# Patient Record
Sex: FEMALE | Race: WHITE | Employment: FULL TIME | URBAN - METROPOLITAN AREA
[De-identification: names, ages, dates, MRNs, and addresses within clinical notes are randomized per-mention and may not be internally consistent; named-entity substitution may affect disease eponyms.]

---

## 2017-10-20 ENCOUNTER — OFFICE VISIT (OUTPATIENT)
Dept: FAMILY MEDICINE CLINIC | Facility: CLINIC | Age: 37
End: 2017-10-20

## 2017-10-20 VITALS
TEMPERATURE: 98 F | RESPIRATION RATE: 16 BRPM | OXYGEN SATURATION: 98 % | DIASTOLIC BLOOD PRESSURE: 64 MMHG | SYSTOLIC BLOOD PRESSURE: 110 MMHG | BODY MASS INDEX: 25.68 KG/M2 | HEART RATE: 60 BPM | WEIGHT: 156 LBS | HEIGHT: 65.5 IN

## 2017-10-20 DIAGNOSIS — R05.3 CHRONIC COUGH: Primary | ICD-10-CM

## 2017-10-20 DIAGNOSIS — R09.82 POST-NASAL DRIP: ICD-10-CM

## 2017-10-20 PROCEDURE — 99202 OFFICE O/P NEW SF 15 MIN: CPT | Performed by: NURSE PRACTITIONER

## 2017-10-20 RX ORDER — FLUTICASONE PROPIONATE 50 MCG
SPRAY, SUSPENSION (ML) NASAL
Qty: 1 BOTTLE | Refills: 0 | Status: SHIPPED | OUTPATIENT
Start: 2017-10-20

## 2017-10-20 NOTE — PROGRESS NOTES
CHIEF COMPLAINT:   Patient presents with:  Cough  Post Nasal Drip        HPI:   aYhaira Manning is a 44year old female who presents for cough for 1 month. Pt reports she had cold sx 1 month ago with nasal congestion, sore throat, mild cough.   Reports CARDIOVASCULAR: Denies chest pain or palpitations  LUNGS: Per HPI. GI: Denies N/V/C/D or abdominal pain. EXAM:   /64   Pulse 60   Temp 98.2 °F (36.8 °C) (Oral)   Resp 16   Ht 65.5\"   Wt 156 lb   LMP 10/06/2017   SpO2 98%   Breastfeeding?  No Everyone has had a cough as part of the common cold, flu, or bronchitis. This kind of cough occurs along with an achy feeling, low-grade fever, nasal and sinus congestion, and a scratchy or sore throat. This usually gets better in 2 to 3 weeks.  A cough bronwyn Cough may be the only sign of mild asthma. You may have tests to find out if asthma is causing your cough. You may also take asthma medicine on a trial basis.   Acid reflux (heartburn, GERD)  The esophagus is the tube that carries food from the mouth to the The patient indicates understanding of these issues and agrees to the plan.

## 2017-10-21 ENCOUNTER — TELEPHONE (OUTPATIENT)
Dept: FAMILY MEDICINE CLINIC | Facility: CLINIC | Age: 37
End: 2017-10-21

## 2017-10-21 NOTE — PATIENT INSTRUCTIONS
Chronic Cough with Uncertain Cause (Adult)    Everyone has had a cough as part of the common cold, flu, or bronchitis. This kind of cough occurs along with an achy feeling, low-grade fever, nasal and sinus congestion, and a scratchy or sore throat.  This Let your healthcare provider know if you are taking any of these. Asthma  Cough may be the only sign of mild asthma. You may have tests to find out if asthma is causing your cough. You may also take asthma medicine on a trial basis.   Acid reflux (heartbur © 7851-9320 [employer] [address] [place] [address], [place], [address] [place]. All rights reserved. This information is not intended as a substitute for professional medical care. Always follow your healthcare professional's instructions.

## 2017-10-21 NOTE — TELEPHONE ENCOUNTER
Attempted to contact pt for condition update. Was seen in [facility] yesterday. VM left to call with condition update.

## 2017-12-19 ENCOUNTER — OFFICE VISIT (OUTPATIENT)
Dept: FAMILY MEDICINE CLINIC | Facility: CLINIC | Age: 37
End: 2017-12-19

## 2017-12-19 VITALS
TEMPERATURE: 98 F | WEIGHT: 156 LBS | HEIGHT: 64 IN | BODY MASS INDEX: 26.63 KG/M2 | SYSTOLIC BLOOD PRESSURE: 102 MMHG | DIASTOLIC BLOOD PRESSURE: 65 MMHG | HEART RATE: 62 BPM | RESPIRATION RATE: 18 BRPM

## 2017-12-19 DIAGNOSIS — J06.9 ACUTE URI: ICD-10-CM

## 2017-12-19 PROCEDURE — 99212 OFFICE O/P EST SF 10 MIN: CPT | Performed by: FAMILY MEDICINE

## 2017-12-19 PROCEDURE — 99213 OFFICE O/P EST LOW 20 MIN: CPT | Performed by: FAMILY MEDICINE

## 2017-12-19 RX ORDER — AMOXICILLIN 875 MG/1
875 TABLET, COATED ORAL 2 TIMES DAILY
Qty: 20 TABLET | Refills: 0 | Status: SHIPPED | OUTPATIENT
Start: 2017-12-19 | End: 2020-01-28 | Stop reason: ALTCHOICE

## 2017-12-19 NOTE — PROGRESS NOTES
HPI:    Patient ID: Onur Leung is a 44year old female. Pt presents with recurrent cough for a couple months with intermittent sinus/ cold symptoms. Pt has had cough, sore throat at times. No fevers but has been chilled recently.  Pt has tried o Take 1 tablet (875 mg total) by mouth 2 (two) times daily.            Imaging & Referrals:  None       #[id number]

## 2018-02-19 ENCOUNTER — OFFICE VISIT (OUTPATIENT)
Dept: OBGYN CLINIC | Facility: CLINIC | Age: 38
End: 2018-02-19

## 2018-02-19 VITALS
WEIGHT: 157 LBS | HEART RATE: 60 BPM | BODY MASS INDEX: 27 KG/M2 | SYSTOLIC BLOOD PRESSURE: 102 MMHG | DIASTOLIC BLOOD PRESSURE: 65 MMHG

## 2018-02-19 DIAGNOSIS — Z01.419 WELL WOMAN EXAM WITH ROUTINE GYNECOLOGICAL EXAM: Primary | ICD-10-CM

## 2018-02-19 DIAGNOSIS — N64.4 BREAST PAIN, LEFT: ICD-10-CM

## 2018-02-19 PROCEDURE — 99395 PREV VISIT EST AGE 18-39: CPT | Performed by: CLINICAL NURSE SPECIALIST

## 2018-02-19 NOTE — PROGRESS NOTES
Mitali Liriano is a 44year old female [id number] Patient's last menstrual period was 02/05/2018 (exact date). Patient presents with:  Gyn Exam: Annual  Last annual exam was 3/20/15. Last pap was 1/22/14 and normal/ HPV negative.  Denies hx of abnormal pa Father 62     appendix   • Infectious Disease Mother      hepatitis       MEDICATIONS:    Current Outpatient Prescriptions:   •  amoxicillin 875 MG Oral Tab, Take 1 tablet (875 mg total) by mouth 2 (two) times daily. , Disp: 20 tablet, Rfl: 0  •  Fluticason discharge  Cervix:  Normal without tenderness on motion  Uterus: normal in size, contour, position, mobility, without tenderness  Adnexa: normal without masses or tenderness  Perineum: normal  Anus: no hemorroids     Assessment & Plan:  Barrett Hoyos was seen

## 2018-03-01 ENCOUNTER — HOSPITAL ENCOUNTER (OUTPATIENT)
Dept: MAMMOGRAPHY | Facility: HOSPITAL | Age: 38
Discharge: HOME | End: 2018-03-01
Attending: CLINICAL NURSE SPECIALIST
Payer: COMMERCIAL

## 2018-03-01 DIAGNOSIS — N64.4 BREAST PAIN, LEFT: ICD-10-CM

## 2018-03-01 PROCEDURE — 77066 DX MAMMO INCL CAD BI: CPT | Performed by: CLINICAL NURSE SPECIALIST

## 2019-10-07 ENCOUNTER — MED REC SCAN ONLY (OUTPATIENT)
Dept: FAMILY MEDICINE CLINIC | Facility: CLINIC | Age: 39
End: 2019-10-07

## 2019-11-05 ENCOUNTER — OFFICE VISIT (OUTPATIENT)
Dept: OBGYN CLINIC | Facility: CLINIC | Age: 39
End: 2019-11-05
Payer: COMMERCIAL

## 2019-11-05 VITALS
SYSTOLIC BLOOD PRESSURE: 108 MMHG | HEART RATE: 54 BPM | BODY MASS INDEX: 26.23 KG/M2 | DIASTOLIC BLOOD PRESSURE: 69 MMHG | WEIGHT: 153.63 LBS | HEIGHT: 64 IN

## 2019-11-05 DIAGNOSIS — Z01.419 WELL WOMAN EXAM: Primary | ICD-10-CM

## 2019-11-05 DIAGNOSIS — Z12.4 SCREENING FOR MALIGNANT NEOPLASM OF CERVIX: ICD-10-CM

## 2019-11-05 DIAGNOSIS — Z12.31 SCREENING MAMMOGRAM, ENCOUNTER FOR: ICD-10-CM

## 2019-11-05 PROCEDURE — 99396 PREV VISIT EST AGE 40-64: CPT | Performed by: OBSTETRICS & GYNECOLOGY

## 2019-11-05 NOTE — H&P
HPI:  The patient is a 36 yo F here for WWE. NO GYN c/o. Monthly cycles with 7 days light flow. Condoms with IC.  and monogamous. Wants to see  for screening due to strong family h/o CA.       LPS: 1/22/14 neg  Mammo: 3/1/18 cat 2    R Stress: Not on file    Relationships      Social connections:        Talks on phone: Not on file        Gets together: Not on file        Attends Congregation service: Not on file        Active member of club or organization: Not on file        Attends meetin breath  Gastrointestinal:  denies heartburn, abdominal pain, diarrhea or constipation  Genitourinary:  denies dysuria, incontinence, abnormal vaginal discharge, vaginal itching  Musculoskeletal:  denies back pain.   Skin/Breast:  Denies any breast pain, lum patient   5. Discussed diet, exercise, MVIs  6.  Follow up in  1 yr for annual

## 2020-01-20 ENCOUNTER — HOSPITAL ENCOUNTER (OUTPATIENT)
Dept: MAMMOGRAPHY | Facility: HOSPITAL | Age: 40
Discharge: HOME | End: 2020-01-20
Attending: OBSTETRICS & GYNECOLOGY
Payer: COMMERCIAL

## 2020-01-20 DIAGNOSIS — Z12.31 SCREENING MAMMOGRAM, ENCOUNTER FOR: ICD-10-CM

## 2020-01-20 PROCEDURE — 77063 BREAST TOMOSYNTHESIS BI: CPT | Performed by: OBSTETRICS & GYNECOLOGY

## 2020-01-20 PROCEDURE — 77067 SCR MAMMO BI INCL CAD: CPT | Performed by: OBSTETRICS & GYNECOLOGY

## 2020-01-21 NOTE — H&P
[address] [facility] [address] Gastroenterology                                                                                                  Clinic History and Physical     Pa perineal laceration    • Labial adhesions     PP, repair   • Lipid screening 2006    per NG      Past Surgical History:   Procedure Laterality Date   •   , 2010   • OTHER SURGICAL HISTORY      repair-labial adhesion PP      Family Hx:   F last menstrual period 01/07/2020, not currently breastfeeding.     Gen: patient appears comfortable and in no acute distress  HEENT: conjunctiva pink, the sclera appears anicteric, oropharynx clear, mucus membranes appear moist  CV: regular rate and rhythm, Split dose Colyte/TriLyte or equivalent  -Anti-platelets and anti-coagulants: None  -Diabetes meds: None    ** If MAC @ [facility]:    - HOLD ACE/ARBs the night before and/or the day of the procedure(s) - N/A   - NO alcohol, recreational drugs nor erectile dys

## 2020-01-28 ENCOUNTER — TELEPHONE (OUTPATIENT)
Dept: GASTROENTEROLOGY | Facility: CLINIC | Age: 40
End: 2020-01-28

## 2020-01-28 ENCOUNTER — OFFICE VISIT (OUTPATIENT)
Dept: GASTROENTEROLOGY | Facility: CLINIC | Age: 40
End: 2020-01-28
Payer: COMMERCIAL

## 2020-01-28 VITALS
HEIGHT: 64 IN | WEIGHT: 152.38 LBS | SYSTOLIC BLOOD PRESSURE: 103 MMHG | DIASTOLIC BLOOD PRESSURE: 68 MMHG | BODY MASS INDEX: 26.02 KG/M2 | HEART RATE: 50 BPM

## 2020-01-28 DIAGNOSIS — Z12.11 SCREENING FOR COLON CANCER: Primary | ICD-10-CM

## 2020-01-28 DIAGNOSIS — R10.13 DYSPEPSIA: ICD-10-CM

## 2020-01-28 DIAGNOSIS — Z80.0 FAMILY HISTORY OF COLON CANCER: ICD-10-CM

## 2020-01-28 PROCEDURE — 99203 OFFICE O/P NEW LOW 30 MIN: CPT | Performed by: NURSE PRACTITIONER

## 2020-01-28 NOTE — PATIENT INSTRUCTIONS
-Schedule colonoscopy w/ Dr. Hermila Martinez, Dr. Elena Rebolledo, Dr. Quin Newman with IV Meherrin or MAC  Dx: screening, [facility] [place]  -Eligible for NE: Yes  -Prep: Split dose Colyte/TriLyte or equivalent  -Anti-platelets and anti-coagulants: None  -Diabetes meds: None    ** If MA

## 2020-01-28 NOTE — TELEPHONE ENCOUNTER
Scheduled for:  Colonoscopy 75531  Provider Name: Dalila Davis  Date:  2/25/2020  Location:  [facility]  Sedation:  Mac  Time:  1600 ---- Arrival 1500 ( Estimate time )   Prep: Colyte or Trilte  Meds/Allergies Reconciled?:  Physician reviewed  Diagnosis with codes:

## 2020-02-24 ENCOUNTER — TELEPHONE (OUTPATIENT)
Dept: GASTROENTEROLOGY | Facility: CLINIC | Age: 40
End: 2020-02-24

## 2020-02-24 NOTE — TELEPHONE ENCOUNTER
Scheduled for:  Colonoscopy 74367  Provider Name: Germaine Ovalle  Date:  04/21/2020  Location:  [facility]  Sedation:  Mac  Time:  1:00pm --- 12:00pm ( Estimate time )   Prep: Colyte or Trilyte  Meds/Allergies Reconciled?:  Physician reviewed  Diagnosis with codes:  Co

## 2020-02-24 NOTE — TELEPHONE ENCOUNTER
CASEYM (x2) to reschedule procedure. Procedure CANCELED in Epic. Please transfer to [facility] at ext [phone] for scheduling.

## 2020-02-24 NOTE — TELEPHONE ENCOUNTER
Dr Tutu Díaz - Patient opened her prep this morning, can you send new prep to pharmacy. Procedure rescheduled to 04/21/2020    Thank you.

## 2020-05-11 ENCOUNTER — TELEMEDICINE (OUTPATIENT)
Dept: FAMILY MEDICINE CLINIC | Facility: CLINIC | Age: 40
End: 2020-05-11
Payer: COMMERCIAL

## 2020-05-11 VITALS — TEMPERATURE: 97 F

## 2020-05-11 DIAGNOSIS — H01.004 BLEPHARITIS OF LEFT UPPER EYELID, UNSPECIFIED TYPE: ICD-10-CM

## 2020-05-11 PROCEDURE — 99213 OFFICE O/P EST LOW 20 MIN: CPT | Performed by: FAMILY MEDICINE

## 2020-05-11 RX ORDER — ERYTHROMYCIN 5 MG/G
OINTMENT OPHTHALMIC
Qty: 1 TUBE | Refills: 0 | Status: SHIPPED | OUTPATIENT
Start: 2020-05-11

## 2020-05-11 NOTE — PROGRESS NOTES
HPI:    Patient ID: Doron Borja is a 35year old female. This visit is conducted using Telemedicine with live, interactive video and audio during this Coronavirus pandemic.     Please note that the following visit was completed using two-wayjulia (SUPREP BOWEL PREP KIT) 17.5-3.13-1.6 GM/177ML Oral Solution As directed. (Patient not taking: Reported on 5/11/2020 ) 1 Bottle 0   • PEG 3350-KCl-NaBcb-NaCl-NaSulf (COLYTE WITH FLAVOR PACKS) 240 g Oral Recon Soln Take prep as directed by gastro office.  Jeannine Voss

## 2020-05-14 ENCOUNTER — TELEPHONE (OUTPATIENT)
Dept: GASTROENTEROLOGY | Facility: CLINIC | Age: 40
End: 2020-05-14

## 2020-05-18 ENCOUNTER — TELEPHONE (OUTPATIENT)
Dept: GASTROENTEROLOGY | Facility: CLINIC | Age: 40
End: 2020-05-18

## 2020-05-18 NOTE — TELEPHONE ENCOUNTER
Contacted pt to offer her earlier procedure date of 5/26 or 6/2 w/ [facility]

## 2020-07-10 ENCOUNTER — HOSPITAL ENCOUNTER (OUTPATIENT)
Age: 40
Discharge: HOME | End: 2020-07-10
Payer: COMMERCIAL

## 2020-07-10 ENCOUNTER — NURSE TRIAGE (OUTPATIENT)
Dept: FAMILY MEDICINE CLINIC | Facility: CLINIC | Age: 40
End: 2020-07-10

## 2020-07-10 VITALS
BODY MASS INDEX: 24.11 KG/M2 | HEART RATE: 65 BPM | DIASTOLIC BLOOD PRESSURE: 83 MMHG | WEIGHT: 150 LBS | TEMPERATURE: 98 F | OXYGEN SATURATION: 100 % | RESPIRATION RATE: 14 BRPM | HEIGHT: 66 IN | SYSTOLIC BLOOD PRESSURE: 129 MMHG

## 2020-07-10 DIAGNOSIS — R05.9 COUGH: Primary | ICD-10-CM

## 2020-07-10 DIAGNOSIS — Z20.822 ENCOUNTER FOR LABORATORY TESTING FOR COVID-19 VIRUS: ICD-10-CM

## 2020-07-10 LAB — AMB EXT COVID-19 RESULT: DETECTED

## 2020-07-10 PROCEDURE — U0003 INFECTIOUS AGENT DETECTION BY NUCLEIC ACID (DNA OR RNA); SEVERE ACUTE RESPIRATORY SYNDROME CORONAVIRUS 2 (SARS-COV-2) (CORONAVIRUS DISEASE [COVID-19]), AMPLIFIED PROBE TECHNIQUE, MAKING USE OF HIGH THROUGHPUT TECHNOLOGIES AS DESCRIBED BY CMS-2020-01-R: HCPCS | Performed by: NURSE PRACTITIONER

## 2020-07-10 PROCEDURE — 99213 OFFICE O/P EST LOW 20 MIN: CPT | Performed by: NURSE PRACTITIONER

## 2020-07-10 NOTE — ED INITIAL ASSESSMENT (HPI)
Pt presents to the IC with c/o a cough, headaches and loss of smell. Pt presents with a request for covid testing.

## 2020-07-10 NOTE — ED PROVIDER NOTES
Patient presents with:  Cough/URI  Headache      HPI:     Laura Bird is a 38year old female who presents for evaluation and management of a chief complaint of a dry cough, post nasal drip, and a loss of smell for the past couple days.  She denies control/protection: Condom    Lifestyle      Physical activity:        Days per week: Not on file        Minutes per session: Not on file      Stress: Not on file    Relationships      Social connections:        Talks on phone: Not on file        Gets toge bilateral: normal  NOSE: nasal turbinates: pink, normal mucosa  THROAT: clear, without exudates, uvula midline and airway patent  LUNGS: clear to auscultation bilaterally; no rales, rhonchi, or wheezes    MDM/Assessment/Plan:   Orders for this encounter:

## 2020-07-10 NOTE — TELEPHONE ENCOUNTER
Action Requested: Summary for Provider     []  Critical Lab, Recommendations Needed  [] Need Additional Advice  [x]   FYI    []   Need Orders  [] Need Medications Sent to Pharmacy  []  Other     SUMMARY: Patient calling with complaint of loss of smell, caroline

## 2020-07-12 LAB — SARS-COV-2 RNA RESP QL NAA+PROBE: DETECTED

## 2020-07-14 ENCOUNTER — VIRTUAL PHONE E/M (OUTPATIENT)
Dept: FAMILY MEDICINE CLINIC | Facility: CLINIC | Age: 40
End: 2020-07-14

## 2020-07-14 DIAGNOSIS — U07.1 COVID-19: ICD-10-CM

## 2020-07-14 PROCEDURE — 99213 OFFICE O/P EST LOW 20 MIN: CPT | Performed by: FAMILY MEDICINE

## 2020-07-14 NOTE — TELEPHONE ENCOUNTER
Diagnosis:      ICD-10-CM     1. Cough R05     2. Encounter for laboratory testing for COVID-19 virus Z11.59           All results reviewed and discussed with patient.   See AVS for detailed discharge instructions for your condition today.     Follow Up wit

## 2020-07-14 NOTE — PROGRESS NOTES
HPI:    Patient ID: Atul Guadalupe is a 38year old female. Virtual Telephone Check-In    Atul Guadalupe verbally consents to a Virtual/Telephone Check-In visit on 07/14/20.   Patient has been referred to the [facility] website at [email or web address] each nostril daily (Patient not taking: Reported on 5/11/2020 ) 1 Bottle 0     Allergies:No Known Allergies   PHYSICAL EXAM:   Physical Exam   Constitutional: No distress.    Pulmonary/Chest:   Pt is breathing comfortable over the phone and speaking in full

## 2020-07-18 ENCOUNTER — TELEPHONE (OUTPATIENT)
Dept: FAMILY MEDICINE CLINIC | Facility: CLINIC | Age: 40
End: 2020-07-18

## 2020-07-18 NOTE — TELEPHONE ENCOUNTER
Pt states she tested positive for Covid-19, pt was tested on 7/10/2020. Pt states she needs to be tested again and have a negative Covid-19 test because her sons are playing baseball and all family members need to test negative before they can participate.

## 2020-07-20 NOTE — TELEPHONE ENCOUNTER
Advised patient of Dr. Shelly Sierra note. Information on testing centers provided to patient. Patient verbalized understanding.

## 2020-07-20 NOTE — TELEPHONE ENCOUNTER
Message noted. She will need to be in self quarantine for 2 weeks. As far as requesting.  Our facility does not due retesting so she may need to do this at one of the public health sites or [facility]. She will need to rest at least 2 weeks from her last test.

## 2020-08-17 ENCOUNTER — NURSE TRIAGE (OUTPATIENT)
Dept: FAMILY MEDICINE CLINIC | Facility: CLINIC | Age: 40
End: 2020-08-17

## 2020-08-17 NOTE — TELEPHONE ENCOUNTER
Action Requested: Summary for Provider     []  Critical Lab, Recommendations Needed  [] Need Additional Advice  [x]   FYI    []   Need Orders  [] Need Medications Sent to Pharmacy  []  Other     SUMMARY: Patient states she tested positive for COVID-19 on

## 2020-08-18 ENCOUNTER — TELEMEDICINE (OUTPATIENT)
Dept: FAMILY MEDICINE CLINIC | Facility: CLINIC | Age: 40
End: 2020-08-18
Payer: COMMERCIAL

## 2020-08-18 DIAGNOSIS — R05.9 COUGH: ICD-10-CM

## 2020-08-18 DIAGNOSIS — Z86.16 HISTORY OF 2019 NOVEL CORONAVIRUS DISEASE (COVID-19): ICD-10-CM

## 2020-08-18 PROCEDURE — 99213 OFFICE O/P EST LOW 20 MIN: CPT | Performed by: FAMILY MEDICINE

## 2020-08-18 NOTE — PROGRESS NOTES
HPI:    Patient ID: Kaye Pedraza is a 38year old female. This visit is conducted using Telemedicine with live, interactive video and audio during this Coronavirus pandemic.     Please note that the following visit was completed using two-way, julia and stridor. Cardiovascular: Negative for chest pain. Current Outpatient Medications   Medication Sig Dispense Refill   • erythromycin 5 MG/GM Ophthalmic Ointment Apply a small dab to the affected area of the eye as directed once per day.  1 T

## 2020-10-01 ENCOUNTER — TELEPHONE (OUTPATIENT)
Dept: OBGYN CLINIC | Facility: CLINIC | Age: 40
End: 2020-10-01

## 2020-10-01 NOTE — TELEPHONE ENCOUNTER
Pt informed according to our records, she had a aboRH done in 2009 and her blood type is A positive. Pt asking if people with A positive blood type are more susceptible to COVID. Pt informed I am unsure and she may want to ask her PCP that question.  Pt giovanna

## 2020-11-05 ENCOUNTER — TELEPHONE (OUTPATIENT)
Dept: GASTROENTEROLOGY | Facility: CLINIC | Age: 40
End: 2020-11-05

## 2020-11-05 NOTE — TELEPHONE ENCOUNTER
CBLM to cancel & reschedule procedure. Please transfer to Nikos Crowder at ext [phone] for scheduling. Canceled in [address] Electronic case CANCEL request was sent to [facility] via \[facility] [place] [facility]\"". Notified pt of cancellation on VM box.          Canceled for:  Colonoscopy -

## 2020-11-25 NOTE — TELEPHONE ENCOUNTER
Left message to call back and schedule. If patient calls back please forward call to GI Schedulers. 2nd attempt to schedule patient with no success. Per protocol ok to send out letter. TE Closed.

## 2021-01-31 ENCOUNTER — HOSPITAL ENCOUNTER (OUTPATIENT)
Age: 41
Discharge: HOME | End: 2021-01-31
Payer: COMMERCIAL

## 2021-01-31 VITALS
RESPIRATION RATE: 16 BRPM | SYSTOLIC BLOOD PRESSURE: 115 MMHG | OXYGEN SATURATION: 100 % | HEART RATE: 60 BPM | DIASTOLIC BLOOD PRESSURE: 56 MMHG | TEMPERATURE: 97 F

## 2021-01-31 DIAGNOSIS — Z20.822 CLOSE EXPOSURE TO COVID-19 VIRUS: Primary | ICD-10-CM

## 2021-01-31 DIAGNOSIS — Z20.822 LAB TEST NEGATIVE FOR COVID-19 VIRUS: ICD-10-CM

## 2021-01-31 LAB — SARS-COV-2 RNA RESP QL NAA+PROBE: NOT DETECTED

## 2021-01-31 PROCEDURE — 99213 OFFICE O/P EST LOW 20 MIN: CPT | Performed by: NURSE PRACTITIONER

## 2021-01-31 NOTE — ED PROVIDER NOTES
Patient Seen in: [facility]      History   Patient presents with:  Covid-19 Test    Stated Complaint: Testing- no symptoms    HPI/Subjective:   HPI    Patient presents to the immediate care requesting a COVID-19 test.  Patient states that he Negative. Skin: Negative. Neurological: Negative. Psychiatric/Behavioral: Negative. Positive for stated complaint: Testing- no symptoms  Other systems are as noted in HPI. Constitutional and vital signs reviewed.       All other systems rev advised that since she did have a close contact Covid exposure that if she does develop symptoms she does need to quarantine for 2 weeks.   Advised that if the patient develops  COVID-19 symptoms including cough, fevers, sore throat, shortness of breath, susana

## 2021-02-08 ENCOUNTER — HOSPITAL ENCOUNTER (OUTPATIENT)
Age: 41
Discharge: HOME | End: 2021-02-08
Payer: COMMERCIAL

## 2021-02-08 VITALS
BODY MASS INDEX: 24.11 KG/M2 | DIASTOLIC BLOOD PRESSURE: 67 MMHG | WEIGHT: 150 LBS | HEART RATE: 60 BPM | RESPIRATION RATE: 16 BRPM | TEMPERATURE: 97 F | OXYGEN SATURATION: 100 % | SYSTOLIC BLOOD PRESSURE: 104 MMHG | HEIGHT: 66 IN

## 2021-02-08 DIAGNOSIS — J02.9 VIRAL PHARYNGITIS: Primary | ICD-10-CM

## 2021-02-08 DIAGNOSIS — Z20.822 LAB TEST NEGATIVE FOR COVID-19 VIRUS: ICD-10-CM

## 2021-02-08 LAB
S PYO AG THROAT QL: NEGATIVE
SARS-COV-2 RNA RESP QL NAA+PROBE: NOT DETECTED

## 2021-02-08 PROCEDURE — 87880 STREP A ASSAY W/OPTIC: CPT | Performed by: NURSE PRACTITIONER

## 2021-02-08 PROCEDURE — U0002 COVID-19 LAB TEST NON-CDC: HCPCS | Performed by: NURSE PRACTITIONER

## 2021-02-08 PROCEDURE — 99213 OFFICE O/P EST LOW 20 MIN: CPT | Performed by: NURSE PRACTITIONER

## 2021-02-08 NOTE — ED PROVIDER NOTES
Patient presents with:  Testing      HPI:     Onur Leung is a 38year old female who presents for a mild sore throat for the past 4 to 5 days. No difficulty swallowing. Speech is clear. No difficulty breathing or stridor.   She states she did ha Partners: Male        Birth control/protection: Condom    Lifestyle      Physical activity        Days per week: Not on file        Minutes per session: Not on file      Stress: Not on file    Relationships      Social connections        Talks on phon normal  NOSE: nasal turbinates: pink, normal mucosa  THROAT: redness noted, uvula midline and airway patent  LUNGS: CTA. Nonlabored respirations. No rhonchi, rales, or wheezing. No coughing. NEURO: No deficits.      MDM/Assessment/Plan:   Orders for [facility]

## 2021-02-08 NOTE — ED INITIAL ASSESSMENT (HPI)
Pt c/o sore throat x5 days. No cold/cough. No fever. States  tested positive for covid 1/26/21. States had covid 7/2020. Awake/alert. Breathing easy and even without distress. Speech clear. Skin warm, dry and pink.

## 2021-04-15 ENCOUNTER — IMMUNIZATION (OUTPATIENT)
Dept: LAB | Facility: HOSPITAL | Age: 41
End: 2021-04-15
Attending: EMERGENCY MEDICINE
Payer: COMMERCIAL

## 2021-04-15 DIAGNOSIS — Z23 NEED FOR VACCINATION: Primary | ICD-10-CM

## 2021-04-15 PROCEDURE — 0011A SARSCOV2 VAC 100MCG/0.5ML IM: CPT

## 2021-05-13 ENCOUNTER — IMMUNIZATION (OUTPATIENT)
Dept: LAB | Facility: HOSPITAL | Age: 41
End: 2021-05-13
Attending: EMERGENCY MEDICINE
Payer: COMMERCIAL

## 2021-05-13 DIAGNOSIS — Z23 NEED FOR VACCINATION: Primary | ICD-10-CM

## 2021-05-13 PROCEDURE — 0012A SARSCOV2 VAC 100MCG/0.5ML IM: CPT

## 2022-01-07 ENCOUNTER — IMMUNIZATION (OUTPATIENT)
Dept: LAB | Facility: HOSPITAL | Age: 42
End: 2022-01-07
Attending: EMERGENCY MEDICINE
Payer: COMMERCIAL

## 2022-01-07 DIAGNOSIS — Z23 NEED FOR VACCINATION: Primary | ICD-10-CM

## 2022-01-07 PROCEDURE — 0064A SARSCOV2 VAC 50MCG/0.25ML IM: CPT | Performed by: NURSE PRACTITIONER

## 2022-02-11 ENCOUNTER — OFFICE VISIT (OUTPATIENT)
Dept: FAMILY MEDICINE CLINIC | Facility: CLINIC | Age: 42
End: 2022-02-11
Payer: COMMERCIAL

## 2022-02-11 VITALS
DIASTOLIC BLOOD PRESSURE: 76 MMHG | TEMPERATURE: 99 F | WEIGHT: 156 LBS | BODY MASS INDEX: 25.07 KG/M2 | SYSTOLIC BLOOD PRESSURE: 104 MMHG | OXYGEN SATURATION: 99 % | HEIGHT: 66 IN | RESPIRATION RATE: 12 BRPM | HEART RATE: 53 BPM

## 2022-02-11 DIAGNOSIS — H00.011 HORDEOLUM EXTERNUM OF RIGHT UPPER EYELID: Primary | ICD-10-CM

## 2022-02-11 PROCEDURE — 3008F BODY MASS INDEX DOCD: CPT | Performed by: NURSE PRACTITIONER

## 2022-02-11 PROCEDURE — 3074F SYST BP LT 130 MM HG: CPT | Performed by: NURSE PRACTITIONER

## 2022-02-11 PROCEDURE — 3078F DIAST BP <80 MM HG: CPT | Performed by: NURSE PRACTITIONER

## 2022-02-11 PROCEDURE — 99213 OFFICE O/P EST LOW 20 MIN: CPT | Performed by: NURSE PRACTITIONER

## 2022-02-11 RX ORDER — ERYTHROMYCIN 5 MG/G
1 OINTMENT OPHTHALMIC 2 TIMES DAILY
Qty: 1 G | Refills: 0 | Status: SHIPPED | OUTPATIENT
Start: 2022-02-11 | End: 2022-02-18

## 2022-09-30 ENCOUNTER — OFFICE VISIT (OUTPATIENT)
Dept: OBGYN CLINIC | Facility: CLINIC | Age: 42
End: 2022-09-30

## 2022-09-30 VITALS
HEART RATE: 55 BPM | SYSTOLIC BLOOD PRESSURE: 99 MMHG | BODY MASS INDEX: 25 KG/M2 | WEIGHT: 153 LBS | DIASTOLIC BLOOD PRESSURE: 63 MMHG

## 2022-09-30 DIAGNOSIS — Z12.31 VISIT FOR SCREENING MAMMOGRAM: ICD-10-CM

## 2022-09-30 DIAGNOSIS — Z01.419 ENCOUNTER FOR GYNECOLOGICAL EXAMINATION WITHOUT ABNORMAL FINDING: Primary | ICD-10-CM

## 2022-09-30 PROCEDURE — 3078F DIAST BP <80 MM HG: CPT | Performed by: OBSTETRICS & GYNECOLOGY

## 2022-09-30 PROCEDURE — 99396 PREV VISIT EST AGE 40-64: CPT | Performed by: OBSTETRICS & GYNECOLOGY

## 2022-09-30 PROCEDURE — 3074F SYST BP LT 130 MM HG: CPT | Performed by: OBSTETRICS & GYNECOLOGY

## 2022-10-03 LAB — HPV I/H RISK 1 DNA SPEC QL NAA+PROBE: NEGATIVE

## 2022-10-07 ENCOUNTER — HOSPITAL ENCOUNTER (OUTPATIENT)
Dept: MAMMOGRAPHY | Age: 42
Discharge: HOME | End: 2022-10-07
Attending: OBSTETRICS & GYNECOLOGY
Payer: COMMERCIAL

## 2022-10-07 DIAGNOSIS — Z12.31 VISIT FOR SCREENING MAMMOGRAM: ICD-10-CM

## 2022-10-07 PROCEDURE — 77063 BREAST TOMOSYNTHESIS BI: CPT | Performed by: OBSTETRICS & GYNECOLOGY

## 2022-10-07 PROCEDURE — 77067 SCR MAMMO BI INCL CAD: CPT | Performed by: OBSTETRICS & GYNECOLOGY

## 2023-01-03 ENCOUNTER — APPOINTMENT (OUTPATIENT)
Dept: GENERAL RADIOLOGY | Age: 43
End: 2023-01-03
Attending: NURSE PRACTITIONER
Payer: COMMERCIAL

## 2023-01-03 ENCOUNTER — HOSPITAL ENCOUNTER (OUTPATIENT)
Age: 43
Discharge: HOME | End: 2023-01-03
Payer: COMMERCIAL

## 2023-01-03 VITALS
BODY MASS INDEX: 24.75 KG/M2 | WEIGHT: 154 LBS | DIASTOLIC BLOOD PRESSURE: 46 MMHG | SYSTOLIC BLOOD PRESSURE: 119 MMHG | RESPIRATION RATE: 16 BRPM | HEIGHT: 66 IN | TEMPERATURE: 98 F | OXYGEN SATURATION: 100 % | HEART RATE: 71 BPM

## 2023-01-03 DIAGNOSIS — R05.1 ACUTE COUGH: Primary | ICD-10-CM

## 2023-01-03 DIAGNOSIS — B34.9 VIRAL ILLNESS: ICD-10-CM

## 2023-01-03 PROCEDURE — 99213 OFFICE O/P EST LOW 20 MIN: CPT | Performed by: NURSE PRACTITIONER

## 2023-01-03 PROCEDURE — 71046 X-RAY EXAM CHEST 2 VIEWS: CPT | Performed by: NURSE PRACTITIONER

## 2023-01-03 NOTE — DISCHARGE INSTRUCTIONS
Tylenol/ibuprofen as needed for pain and fever  Follow-up with your primary care doctor if symptoms persist  Return for any new or worsening symptoms at any time

## 2023-01-03 NOTE — ED INITIAL ASSESSMENT (HPI)
Pt c/o cough and runny nose 1 week ago, better then started again with cough, cold and chest pain x4 days. No fever. No SOB.

## 2023-10-20 ENCOUNTER — OFFICE VISIT (OUTPATIENT)
Dept: OBGYN CLINIC | Facility: CLINIC | Age: 43
End: 2023-10-20

## 2023-10-20 VITALS
HEART RATE: 48 BPM | SYSTOLIC BLOOD PRESSURE: 107 MMHG | BODY MASS INDEX: 26.02 KG/M2 | DIASTOLIC BLOOD PRESSURE: 69 MMHG | HEIGHT: 65 IN | WEIGHT: 156.19 LBS

## 2023-10-20 DIAGNOSIS — R92.30 DENSE BREAST TISSUE: ICD-10-CM

## 2023-10-20 DIAGNOSIS — Z12.31 ENCOUNTER FOR SCREENING MAMMOGRAM FOR MALIGNANT NEOPLASM OF BREAST: ICD-10-CM

## 2023-10-20 DIAGNOSIS — Z01.419 WELL WOMAN EXAM WITH ROUTINE GYNECOLOGICAL EXAM: Primary | ICD-10-CM

## 2023-10-20 PROCEDURE — 99396 PREV VISIT EST AGE 40-64: CPT | Performed by: NURSE PRACTITIONER

## 2023-10-20 PROCEDURE — 3008F BODY MASS INDEX DOCD: CPT | Performed by: NURSE PRACTITIONER

## 2023-10-20 PROCEDURE — 3078F DIAST BP <80 MM HG: CPT | Performed by: NURSE PRACTITIONER

## 2023-10-20 PROCEDURE — 3074F SYST BP LT 130 MM HG: CPT | Performed by: NURSE PRACTITIONER

## 2023-10-20 RX ORDER — UBIDECARENONE 30 MG
CAPSULE ORAL
COMMUNITY

## 2023-10-26 ENCOUNTER — OFFICE VISIT (OUTPATIENT)
Facility: CLINIC | Age: 43
End: 2023-10-26

## 2023-10-26 ENCOUNTER — TELEPHONE (OUTPATIENT)
Facility: CLINIC | Age: 43
End: 2023-10-26

## 2023-10-26 VITALS
BODY MASS INDEX: 25.16 KG/M2 | HEIGHT: 65 IN | WEIGHT: 151 LBS | DIASTOLIC BLOOD PRESSURE: 73 MMHG | SYSTOLIC BLOOD PRESSURE: 109 MMHG | HEART RATE: 51 BPM

## 2023-10-26 DIAGNOSIS — Z12.11 COLON CANCER SCREENING: Primary | ICD-10-CM

## 2023-10-26 DIAGNOSIS — Z80.0 FAMILY HISTORY OF COLON CANCER: ICD-10-CM

## 2023-10-26 DIAGNOSIS — Z80.0 FAMILY HX OF COLON CANCER: ICD-10-CM

## 2023-10-26 PROCEDURE — 3074F SYST BP LT 130 MM HG: CPT

## 2023-10-26 PROCEDURE — 3008F BODY MASS INDEX DOCD: CPT

## 2023-10-26 PROCEDURE — 3078F DIAST BP <80 MM HG: CPT

## 2023-10-26 PROCEDURE — S0285 CNSLT BEFORE SCREEN COLONOSC: HCPCS

## 2023-10-26 NOTE — PATIENT INSTRUCTIONS
1. Schedule colonoscopy with Dr. Shirin Angel, Dr. Tomás Montesinos or Dr. Penn   Diagnosis: CRC screen, family hx of colon CA  Sedation: MAC or IV  Prep: split dose golytely    2.  bowel prep from pharmacy   You can pick the bowel prep up now and store in a cool, dry place in your home until your scheduled bowel prep start date. 3. Continue all medications as normal for your procedure. DO NOT TAKE: Any form of alcohol, recreational drugs and any forms of erectile dysfunction medications 24 hours prior to procedure. 4. Read all bowel prep instructions carefully. Bowel prep instructions can also be found online at:  www.[facility].org/giprep     5. AVOID seeds, nuts, popcorn, raw fruits and vegetables for 5 days before procedure    6. If you start any NEW medication after your visit today, please notify us. Certain medications (like iron or weight loss medications) will need to be held before the procedure, or the procedure cannot be performed safely.

## 2023-10-26 NOTE — TELEPHONE ENCOUNTER
Scheduled for:  Colonoscopy 58574 / [id number]    Provider Name:  Dr. Darek Rebolledo    Date:  1/17/24     Location:[facility]    Time:  12:30 PM  ( Patient is aware to arrive at 11:30 AM)     Sedation:  MAC    Prep:  Split Golytely      Meds/Allergies Reconciled?:   Provider Reviewed     Diagnosis with codes:    Colon screen Z12.11  Family Hx of Colon cancer Z80.0    Was patient informed to call insurance with codes (Y/N):  Yes     Referral sent?: Referral was sent at the time of electronic surgical scheduling. [facility] or [address] notified?: I sent an electronic request to Endo Scheduling and received a confirmation today. Medication Orders:  Patient is aware to NOT take iron pills, herbal meds and diet supplements for 7 days before exam. Also to NOT take any form of alcohol, recreational drugs and any forms of ED meds 24 hours before exam.     Misc Orders:  N/A     Further instructions given by staff:  I Provided prep instructions to patient and reviewed date, time and location. Patient verbalized that She understood and is aware to call with any questions. Patient was informed about the new cancellation policy for Her procedure. Patient was also given copy of the cancellation policy at the time of the appointment and verbalized understanding.

## 2023-11-15 ENCOUNTER — OFFICE VISIT (OUTPATIENT)
Dept: FAMILY MEDICINE CLINIC | Facility: CLINIC | Age: 43
End: 2023-11-15
Payer: COMMERCIAL

## 2023-11-15 VITALS
WEIGHT: 153 LBS | DIASTOLIC BLOOD PRESSURE: 70 MMHG | RESPIRATION RATE: 16 BRPM | HEART RATE: 63 BPM | TEMPERATURE: 98 F | SYSTOLIC BLOOD PRESSURE: 112 MMHG | OXYGEN SATURATION: 99 % | BODY MASS INDEX: 25.49 KG/M2 | HEIGHT: 65 IN

## 2023-11-15 DIAGNOSIS — R21 RASH: Primary | ICD-10-CM

## 2023-11-15 PROCEDURE — 3074F SYST BP LT 130 MM HG: CPT | Performed by: NURSE PRACTITIONER

## 2023-11-15 PROCEDURE — 99213 OFFICE O/P EST LOW 20 MIN: CPT | Performed by: NURSE PRACTITIONER

## 2023-11-15 PROCEDURE — 3078F DIAST BP <80 MM HG: CPT | Performed by: NURSE PRACTITIONER

## 2023-11-15 PROCEDURE — 3008F BODY MASS INDEX DOCD: CPT | Performed by: NURSE PRACTITIONER

## 2023-11-15 RX ORDER — TRIAMCINOLONE ACETONIDE 1 MG/G
CREAM TOPICAL 2 TIMES DAILY
Qty: 30 G | Refills: 0 | Status: SHIPPED | OUTPATIENT
Start: 2023-11-15

## 2023-11-15 RX ORDER — KETOCONAZOLE 20 MG/G
1 CREAM TOPICAL DAILY
Qty: 30 G | Refills: 0 | Status: SHIPPED | OUTPATIENT
Start: 2023-11-15

## 2023-11-15 NOTE — PATIENT INSTRUCTIONS
Keep skin clean and dry  Antibacterial soap to area    Triamcinolone cream as prescribed   Ketoconazole cream as prescribed    Follow-up if not improving

## 2023-12-09 ENCOUNTER — HOSPITAL ENCOUNTER (OUTPATIENT)
Dept: MAMMOGRAPHY | Age: 43
Discharge: HOME | End: 2023-12-09
Attending: NURSE PRACTITIONER
Payer: COMMERCIAL

## 2023-12-09 DIAGNOSIS — R92.30 DENSE BREAST TISSUE: ICD-10-CM

## 2023-12-09 DIAGNOSIS — Z12.31 ENCOUNTER FOR SCREENING MAMMOGRAM FOR MALIGNANT NEOPLASM OF BREAST: ICD-10-CM

## 2023-12-09 PROCEDURE — 77063 BREAST TOMOSYNTHESIS BI: CPT | Performed by: NURSE PRACTITIONER

## 2023-12-09 PROCEDURE — 77067 SCR MAMMO BI INCL CAD: CPT | Performed by: NURSE PRACTITIONER

## 2023-12-12 NOTE — PROGRESS NOTES
Normal mammogram, repeat in one year.  Dense breast tissue - will offer ABUS (bilateral whole breast screening US)  TRAN Cabezas

## 2024-01-15 ENCOUNTER — TELEPHONE (OUTPATIENT)
Facility: CLINIC | Age: 44
End: 2024-01-15

## 2024-01-15 NOTE — TELEPHONE ENCOUNTER
I spoke to the pt     I let her know her her procedure is already coded as screening Z12.11    Pt voices understanding

## 2024-01-15 NOTE — TELEPHONE ENCOUNTER
Pt is calling to request 1/17/24 CLN.  She states that she needs this coded as a routine, so that her insurance will cover 100 %.  She states that it is currently coded as diagnostic.  She is requesting a call back .  Please call

## 2024-01-17 ENCOUNTER — HOSPITAL ENCOUNTER (OUTPATIENT)
Age: 44
Setting detail: HOSPITAL OUTPATIENT SURGERY
Discharge: HOME | End: 2024-01-17
Attending: STUDENT IN AN ORGANIZED HEALTH CARE EDUCATION/TRAINING PROGRAM | Admitting: STUDENT IN AN ORGANIZED HEALTH CARE EDUCATION/TRAINING PROGRAM
Payer: COMMERCIAL

## 2024-01-17 ENCOUNTER — ANESTHESIA EVENT (OUTPATIENT)
Dept: ENDOSCOPY | Age: 44
End: 2024-01-17
Payer: COMMERCIAL

## 2024-01-17 ENCOUNTER — ANESTHESIA (OUTPATIENT)
Dept: ENDOSCOPY | Age: 44
End: 2024-01-17
Payer: COMMERCIAL

## 2024-01-17 DIAGNOSIS — Z12.11 COLON CANCER SCREENING: ICD-10-CM

## 2024-01-17 DIAGNOSIS — Z80.0 FAMILY HISTORY OF COLON CANCER: ICD-10-CM

## 2024-01-17 LAB — B-HCG UR QL: NEGATIVE

## 2024-01-17 PROCEDURE — 81025 URINE PREGNANCY TEST: CPT

## 2024-01-17 RX ORDER — LIDOCAINE HYDROCHLORIDE 20 MG/ML
INJECTION, SOLUTION EPIDURAL; INFILTRATION; INTRACAUDAL; PERINEURAL AS NEEDED
Status: DISCONTINUED | OUTPATIENT
Start: 2024-01-17 | End: 2024-01-17 | Stop reason: SURG

## 2024-01-17 RX ORDER — SODIUM CHLORIDE, SODIUM LACTATE, POTASSIUM CHLORIDE, CALCIUM CHLORIDE 600; 310; 30; 20 MG/100ML; MG/100ML; MG/100ML; MG/100ML
INJECTION, SOLUTION INTRAVENOUS CONTINUOUS
Status: DISCONTINUED | OUTPATIENT
Start: 2024-01-17 | End: 2024-01-17

## 2024-01-17 RX ADMIN — SODIUM CHLORIDE, SODIUM LACTATE, POTASSIUM CHLORIDE, CALCIUM CHLORIDE: 600; 310; 30; 20 INJECTION, SOLUTION INTRAVENOUS at 09:26:00

## 2024-01-17 RX ADMIN — LIDOCAINE HYDROCHLORIDE 40 MG: 20 INJECTION, SOLUTION EPIDURAL; INFILTRATION; INTRACAUDAL; PERINEURAL at 09:27:00

## 2024-01-17 RX ADMIN — SODIUM CHLORIDE, SODIUM LACTATE, POTASSIUM CHLORIDE, CALCIUM CHLORIDE: 600; 310; 30; 20 INJECTION, SOLUTION INTRAVENOUS at 09:41:00

## 2024-01-17 NOTE — DISCHARGE INSTRUCTIONS
Home Care Instructions for Colonoscopy  Diet:  - Resume your regular diet as tolerated unless otherwise instructed.  - Start with light meals to minimize bloating.  - Do not drink alcohol today.    Medication:  - If you have questions about resuming your normal medications, please contact your Primary Care Physician.    Activities:  - Take it easy today. Do not return to work today.  - Do not drive today.  - Do not operate any machinery today (including kitchen equipment).    Colonoscopy:  - You may notice some rectal \"spotting\" (a little blood on the toilet tissue) for a day or two after the exam. This is normal.  - If you experience any rectal bleeding (not spotting), persistent tenderness or sharp severe abdominal pains, oral temperature over 100 degrees Fahrenheit, light-headedness or dizziness, or any other problems, contact your doctor.      **If unable to reach your doctor, please go to the [facility] Emergency Room**    - Your referring physician will receive a full report of your examination.  - If you do not hear from your doctor's office within two weeks of your biopsy, please call them for your results.    You may be able to see your laboratory results in [employer] between 4 and 7 business days.  In some cases, your physician may not have viewed the results before they are released to [employer].  If you have questions regarding your results contact the physician who ordered the test/exam by phone or via [employer] by choosing \"Ask a Medical Question.\"

## 2024-01-17 NOTE — OPERATIVE REPORT
COLONOSCOPY REPORT    Madelin Aragon     1980 Age 43 year old   PCP No primary care provider on file. Endoscopist Luis Caro MD       Date of procedure: 24    Procedure: Colonoscopy w/ possible polypectomy    Pre-operative diagnosis: family hx of colon cancer, screening exam    Post-operative diagnosis: incomplete due to poor bowel preparation    Medications: MAC    Withdrawal time: Incomplete exam    Procedure:  Informed consent was obtained from the patient after the risks of the procedure were discussed, including but not limited to bleeding, perforation, aspiration, infection, or possibility of a missed lesion. After discussions of the risks/benefits and alternatives to this procedure, as well as the planned sedation, the patient was placed in the left lateral decubitus position and begun on continuous blood pressure pulse oximetry and EKG monitoring and this was maintained throughout the procedure. Once an adequate level of sedation was obtained a digital rectal exam was completed. Then the lubricated tip of the Pediatric [id number] diagnostic video colonoscope was inserted and advanced to the mid sigmoid colon. There was a large amount of formed stool seen in the rectum and sigmoid colon. The stool was completely solid and without liquid content; the amount of stool present was copious and occupied the entire lumen of the colon. The procedure was aborted given this. Photodocumentation was obtained. Bowel prep was poor.    Complications: none.    Findings:   Copious amount of large, formed stool seen from the rectum to the distal/mid sigmoid colon. The stool was solid and occupied the entire lumen of the colon. Unable to advance the scope beyond the formed stool due to poor visualization.     Impression:   Large amount of formed stool sen in the rectum/sigmoid colon, unable to advance the scope beyond this point.    Recommend:  Repeat colonoscopy with 2 day bowel prep in the  near future.    >>>If tissue was obtained and you have not received your pathology results either by phone or letter within 2 weeks, please call our office at [phone].    Specimens: none    Blood loss: <1 ml

## 2024-01-17 NOTE — ANESTHESIA PREPROCEDURE EVALUATION
Anesthesia PreOp Note    HPI:     Madelin Aragon is a 43 year old female who presents for preoperative consultation requested by: Luis Caro MD    Date of Surgery: 2024    Procedure(s):  COLONOSCOPY  Indication: Colon cancer screening /Family history of colon cancer    Relevant Problems   No relevant active problems       NPO:                         History Review:  There are no problems to display for this patient.      Past Medical History:   Diagnosis Date   • Scoliosis    • TMJ (temporomandibular joint syndrome)        Past Surgical History:   Procedure Laterality Date   •   , 2010   • OTHER SURGICAL HISTORY      repair-labial adhesion PP       Medications Prior to Admission   Medication Sig Dispense Refill Last Dose   • Multiple Vitamins-Minerals (MULTI FOR HER) Oral Tab Take by mouth.      • triamcinolone 0.1 % External Cream Apply topically 2 (two) times daily. Apply to affected area sparingly 2 times daily. 30 g 0    • ketoconazole 2 % External Cream Apply 1 Application topically daily. 2 weeks 30 g 0    • polyethylene glycol, PEG 3350-KCl-NaBcb-NaCl-NaSulf, 236 g Oral Recon Soln Take 4,000 mL by mouth As Directed. Take 2,000 mL the night before your procedure and 2,000 mL the morning of your procedure. Take as directed by GI clinic. Okay to substitute for generic. 1 each 0      No current [facility]-ordered facility-administered medications on file.     No current [facility]-ordered outpatient medications on file.       No Known Allergies    Family History   Problem Relation Age of Onset   • Cancer Father 57        appendix   • Colon Cancer Father    • Infectious Disease Mother         hepatitis   • Neurological Disorder Maternal Grandfather         cerebral aneurysm   • Cancer Paternal Grandmother         rectal   • Cancer Paternal Grandfather         lung-smoker   • Breast Cancer Paternal Aunt 60   • Cancer Paternal Aunt         Colon cancer     Social History     Socioeconomic  History   • Marital status:    Tobacco Use   • Smoking status: Former   • Smokeless tobacco: Never   • Tobacco comments:     quit 2005   Vaping Use   • Vaping Use: Never used   Substance and Sexual Activity   • Alcohol use: Yes     Comment: wine  beer occ   • Drug use: No   • Sexual activity: Yes     Partners: Male   Other Topics Concern   • Caffeine Concern Yes     Comment: coffee, soda, 1 cup daily       Available pre-op labs reviewed.             Vital Signs:  Body mass index is 25.96 kg/m².   height is 1.651 m (5' 5\") and weight is 70.8 kg (156 lb).   Vitals:    01/09/24 1116   Weight: 70.8 kg (156 lb)   Height: 1.651 m (5' 5\")        Anesthesia Evaluation     Patient summary reviewed and Nursing notes reviewed    No history of anesthetic complications   Airway   Mallampati: I  TM distance: >3 FB  Neck ROM: full  Dental - Dentition appears grossly intact     Pulmonary - negative ROS and normal exam   Cardiovascular - negative ROS and normal exam    Neuro/Psych - negative ROS     GI/Hepatic/Renal    (+) bowel prep    Endo/Other    Abdominal                Anesthesia Plan:   ASA:  1  Plan:   MAC and general  Plan Comments: Discussed anesthetic risks such as but, not limited to, CVA, MI, dental damage, awareness and aspiration; verbalizes understanding and wishes to proceed.  The anesthetic dental exam does not represent a complete dental/oral exam performed by a dental professional. Notations on the dental diagram can help to highlight areas of concern and may no reflect all findings.  Informed Consent Plan and Risks Discussed With:  Patient  Discussed plan with:  Attending    I have informed Madelin Aragon and/or legal guardian or family member of the nature of the anesthetic plan, benefits, risks including possible dental damage if relevant, major complications, and any alternative forms of anesthetic management.   All of the patient's questions were answered to the best of my ability. The patient  desires the anesthetic management as planned.  Nora Olivas CRNA  1/17/2024 8:53 AM  Present on Admission:  **None**

## 2024-01-17 NOTE — H&P
History & Physical Examination    Patient Name: Madelin Aragon  MRN: [id number]  CSN: [id number]  YOB: 1980    Diagnosis: screening for colon cancer.    No medications prior to admission.     No current facility-administered medications for this encounter.       Allergies: No Known Allergies    Past Medical History:   Diagnosis Date    Scoliosis     TMJ (temporomandibular joint syndrome)      Past Surgical History:   Procedure Laterality Date      , 2010    OTHER SURGICAL HISTORY      repair-labial adhesion PP     Family History   Problem Relation Age of Onset    Cancer Father 57        appendix    Colon Cancer Father     Infectious Disease Mother         hepatitis    Neurological Disorder Maternal Grandfather         cerebral aneurysm    Cancer Paternal Grandmother         rectal    Cancer Paternal Grandfather         lung-smoker    Breast Cancer Paternal Aunt 60    Cancer Paternal Aunt         Colon cancer     Social History     Tobacco Use    Smoking status: Former    Smokeless tobacco: Never    Tobacco comments:     quit    Substance Use Topics    Alcohol use: Yes     Comment: wine  beer occ       SYSTEM Check if Review is Normal Check if Physical Exam is Normal If not normal, please explain:   HEENT [X ] [ X]    NECK  [X ] [ X]    HEART [X ] [ X]    LUNGS [X ] [ X]    ABDOMEN [X ] [ X]    EXTREMITIES [X ] [ X]    OTHER        I have discussed the risks and benefits and alternatives of the procedure with the patient/family.  They understand and agree to proceed with plan of care.   I have reviewed the History and Physical done within the last 30 days.  Any changes noted above.    Luis Caro MD  [facility]

## 2024-01-17 NOTE — ANESTHESIA POSTPROCEDURE EVALUATION
Patient: Madelin Aragon    Procedure Summary       Date: 01/17/24 Room / Location: [facility] ENDOSCOPY 01 / [facility] ENDO    Anesthesia Start: 0925 Anesthesia Stop:     Procedure: COLONOSCOPY Diagnosis:       Colon cancer screening      Family history of colon cancer      (poor prep)    Surgeons: Luis Caro MD Anesthesiologist:     Anesthesia Type: MAC, general ASA Status: 1            Anesthesia Type: MAC, general    Vitals Value Taken Time   BP 89/44 01/17/24 0945   Temp  01/17/24 0947   Pulse 57 01/17/24 0945   Resp 22 01/17/24 0945   SpO2 99 % 01/17/24 0945       [facility] AN Post Evaluation:   Patient Evaluated in Patient location: Endo recovery.  Patient Participation: complete - patient participated  Level of Consciousness: awake and alert  Pain Score: 0  Pain Management: adequate  Airway Patency:patent  Yes    Cardiovascular Status: acceptable  Respiratory Status: acceptable  Postoperative Hydration acceptable  Comments: Awake. Feels well.       Nora Olivas CRNA  1/17/2024 9:47 AM

## 2024-01-18 VITALS
DIASTOLIC BLOOD PRESSURE: 64 MMHG | HEART RATE: 49 BPM | OXYGEN SATURATION: 100 % | SYSTOLIC BLOOD PRESSURE: 105 MMHG | RESPIRATION RATE: 19 BRPM | WEIGHT: 156 LBS | BODY MASS INDEX: 25.99 KG/M2 | HEIGHT: 65 IN

## 2024-09-17 ENCOUNTER — TELEPHONE (OUTPATIENT)
Facility: CLINIC | Age: 44
End: 2024-09-17

## 2024-09-17 NOTE — TELEPHONE ENCOUNTER
Patient needs to reschedule the procedure that was failed per patient in January 2024 please call

## 2024-09-18 NOTE — TELEPHONE ENCOUNTER
Medications, pharmacy, and allergies reviewed.   Please advise on colonoscopy and bowel prep orders.     › Insurance:  [employer] [place]PO  › Last Visit: seen by Jill Ritter on 10/26/2023  › Last CBC/ CMP :   › H/W/BMI:     Special comments/notes:  Recall    Last Procedure, Date, MD:    2024 CLN by Dr Caro   Last diagnosis:  Incomplete due to poor bowel preparation   Recalled for (mth/yrs):     Sedation used previously:  MAC   Last Prep Used:  colyte   Quality of Prep: Poor.  Needs 2 day bowel prep      Telephone Colon Screening Questionnaire Yes No   Are you currently experiencing any new/abnormal GI symptoms? [] []   If yes, explain:     Rectal bleeding? [] []   Black stool? [] []   Dysphagia or food \"feeling stuck\" when eating? [] []   Intractable vomiting? [] []   Unexplained weight loss? [] []   First colonoscopy? [] []   Family history of colon cancer? [] []   Any issues with anesthesia? [] []   If yes, explain:      Stroke, heart attack or stent placement in the last 12 months:  [] []   History of  respiratory issues/oxygen/ALONSO/COPD: [] []   If yes, CPAP/BiPAP:     History of devices (pacemaker/defibrillator) [] []   History of cardiac/CVA/(MI/stroke): [] []     Medications Yes  No   Anticoagulants (except Aspirin):  [] []   Diabetic Meds  PO: Hold day before and day of procedure  Insulin:  [] []   Weight loss meds (phentermine/vyvanse/saxsenda/etc): [] []   Iron/herbal/multivitamin supplement: [] []   Usage of marijuana, CBD &/or vape products: [] []      Luis Caro MD   Physician  Gastroenterology     Operative Report     Signed     Date of Service: 2024  9:30 AM  Case Time: Procedures: Surgeons:   2024  9:30 AM COLONOSCOPY    Luis Caro MD               Signed         COLONOSCOPY REPORT           Madelin TRACY Aragon      1980 Age 43 year old   PCP No primary care provider on file. Endoscopist Luis Caro MD         Date of procedure: 24      Procedure: Colonoscopy w/ possible polypectomy     Pre-operative diagnosis: family hx of colon cancer, screening exam     Post-operative diagnosis: incomplete due to poor bowel preparation     Medications: MAC     Withdrawal time: Incomplete exam     Procedure:  Informed consent was obtained from the patient after the risks of the procedure were discussed, including but not limited to bleeding, perforation, aspiration, infection, or possibility of a missed lesion. After discussions of the risks/benefits and alternatives to this procedure, as well as the planned sedation, the patient was placed in the left lateral decubitus position and begun on continuous blood pressure pulse oximetry and EKG monitoring and this was maintained throughout the procedure. Once an adequate level of sedation was obtained a digital rectal exam was completed. Then the lubricated tip of the Pediatric [id number] diagnostic video colonoscope was inserted and advanced to the mid sigmoid colon. There was a large amount of formed stool seen in the rectum and sigmoid colon. The stool was completely solid and without liquid content; the amount of stool present was copious and occupied the entire lumen of the colon. The procedure was aborted given this. Photodocumentation was obtained. Bowel prep was poor.     Complications: none.     Findings:   Copious amount of large, formed stool seen from the rectum to the distal/mid sigmoid colon. The stool was solid and occupied the entire lumen of the colon. Unable to advance the scope beyond the formed stool due to poor visualization.      Impression:   Large amount of formed stool sen in the rectum/sigmoid colon, unable to advance the scope beyond this point.     Recommend:  Repeat colonoscopy with 2 day bowel prep in the near future.     >>>If tissue was obtained and you have not received your pathology results either by phone or letter within 2 weeks, please call our office at [phone].      Specimens: none     Blood loss: <1 ml            Electronically signed by Luis Caro MD at 1/17/2024  9:49 AM

## 2024-09-19 NOTE — TELEPHONE ENCOUNTER
Please inquire regarding poor bowel prep. Did she drink the entire golytely? Was it a problem getting it all down? Did she do clear liquid diet day before? Is constipation an issue for her? This would be helpful to determine what prep should be used and it 2 day prep would be needed.  Can also schedule for res24 if better to have this discussion in office.     TRAN Emery

## 2024-09-19 NOTE — TELEPHONE ENCOUNTER
Jill,    You saw this pt 10/26/2023 for colon screening    I spoke to her over the phone    She had a colonoscopy with Dr Caro on 01/17/2024 and had copious amounts of formed stool so the procedure was incomplete    She has had no changes to her health in the past year.    Can you please advise if previous orders can be used    Pt would like to reschedule her procedure with any provider

## 2024-09-20 NOTE — TELEPHONE ENCOUNTER
Called and spoke to the patient, date of birth and name verified.    She stated she finished the whole bowel prep.    Advised office visit to discuss bowel prep instructions.    She agreed and verbalized understanding.    Your Appointments      Wednesday October 02, 2024 2:00 PM  Follow Up Visit with TRAN Emery  [facility] [address] [address]  [phone]

## 2024-10-02 ENCOUNTER — TELEPHONE (OUTPATIENT)
Facility: CLINIC | Age: 44
End: 2024-10-02

## 2024-10-02 ENCOUNTER — OFFICE VISIT (OUTPATIENT)
Facility: CLINIC | Age: 44
End: 2024-10-02
Payer: COMMERCIAL

## 2024-10-02 VITALS
DIASTOLIC BLOOD PRESSURE: 68 MMHG | BODY MASS INDEX: 26.33 KG/M2 | HEART RATE: 58 BPM | WEIGHT: 158 LBS | SYSTOLIC BLOOD PRESSURE: 101 MMHG | HEIGHT: 65 IN

## 2024-10-02 DIAGNOSIS — Z12.11 COLON CANCER SCREENING: Primary | ICD-10-CM

## 2024-10-02 PROCEDURE — 3074F SYST BP LT 130 MM HG: CPT

## 2024-10-02 PROCEDURE — 3078F DIAST BP <80 MM HG: CPT

## 2024-10-02 PROCEDURE — 3008F BODY MASS INDEX DOCD: CPT

## 2024-10-02 PROCEDURE — S0285 CNSLT BEFORE SCREEN COLONOSC: HCPCS

## 2024-10-02 NOTE — TELEPHONE ENCOUNTER
Scheduled for:  Colonoscopy [id number]   Location:  [facility]     Provider: Luis Caro MD    Date:  10/14/2024  Monday  Time:   9:10 AM (Patient made aware will receive phone call the day before with an arrival time)    Sedation:  MAC  Prep:  Split GoLytely  -Buy over the counter dulcolax laxative, and take one tablet daily for 3 days prior to drinking the bowel prep.   AND  -Buy over the counter miralax, mix one capful with water daily for 2 weeks prior to drinking the bowel prep.    Diagnosis with codes:    ICD-10-CM   1. Colon cancer screening  Z12.11        Meds/Allergies Reconciled?:   Provider Reviewed   Was patient informed to call insurance with codes (Y/N):  Yes  Referral sent?: Referral was sent at the time of electronic surgical scheduling.  EM or [facility] notified?: I sent an electronic request to ENDO Scheduling and received a confirmation today.     Medication Orders:  Patient is aware to NOT take iron pills, herbal meds and diet supplements for 7 days before exam. Also to NOT take any form of alcohol, recreational drugs and any forms of ED meds 24 hours before exam.       Misc Orders:  N/A   Further instructions given by staff:  I Provided prep instructions to patient and reviewed date, time and location. Patient verbalized that  She / Her understood and is aware to call with any questions.  Patient was informed about the new cancellation policy for She / Her procedure. Patient was also given copy of the cancellation policy at the time of the appointment and verbalized understanding.

## 2024-10-02 NOTE — PROGRESS NOTES
[facility] - Gastroenterology                                                                                                  Clinic History and Physical     Chief Complaint   Patient presents with    Consult       Requesting physician or provider: No primary care provider on file.    HPI:   Madelin Aragon is a 44 year old year-old female without significant medical history, who presents for colon cancer screening evaluation.    #inadequate bowel prep   -attempted colonoscopy in January, large solid solid in sigmoid, unable to advance scope. Patient reports she did complete the golytely and stool was liquid yellow prior to procedure. She reports bowel habits are daily to every few days. Does not consider constipation an issue for her    Patient denies any GI symptoms of nausea, vomiting, heartburn, dyspepsia, dysphagia, hematemesis, abdominal pain, change in bowel habits, constipation, diarrhea, hematochezia, or melena.  Additionally there is no weight loss and no reported chest pain or shortness of breath.    Last colonoscopy: attempted January 2024 Dr Caro   Large amount of formed stool sen in the rectum/sigmoid colon, unable to advance the scope beyond this point.  Recommend:  Repeat colonoscopy with 2 day bowel prep in the near future.    Last EGD: none     NSAIDS: on occasion   Tobacco: former social smoker, >15 years ago  Alcohol: social  Marijuana: none  Illicit drugs: none    FH GI malignancy:   Paternal aunt - colon cancer  Paternal grandma - rectal cancer  Maternal grandpa - colon cancer  Dad- appendix cancer    No history of adverse reaction to sedation  No ALONSO  No anticoagulants  No pacemaker/defibrillator  No pain medications and/or sleep aides    Wt Readings from Last 6 Encounters:   10/02/24 158 lb (71.7 kg)   01/17/24 156 lb (70.8 kg)   11/15/23 153 lb (69.4 kg)   10/26/23 151 lb (68.5 kg)   10/20/23 156 lb 3.2 oz (70.9 kg)   01/03/23 154 lb (69.9 kg)          History, Medications,  Allergies, ROS:      Past Medical History:    Scoliosis    TMJ (temporomandibular joint syndrome)      Past Surgical History:   Procedure Laterality Date    Colonoscopy N/A 2024    Procedure: COLONOSCOPY;  Surgeon: Luis Caro MD;  Location: [facility] ENDO      , 2010    Other surgical history      repair-labial adhesion PP      Family Hx:   Family History   Problem Relation Age of Onset    Cancer Father 57        appendix    Colon Cancer Father     Infectious Disease Mother         hepatitis    Neurological Disorder Maternal Grandfather         cerebral aneurysm    Cancer Paternal Grandmother         rectal    Cancer Paternal Grandfather         lung-smoker    Breast Cancer Paternal Aunt 60    Cancer Paternal Aunt         Colon cancer      Social History:   Social History     Socioeconomic History    Marital status:    Tobacco Use    Smoking status: Former    Smokeless tobacco: Never    Tobacco comments:     quit    Vaping Use    Vaping status: Never Used   Substance and Sexual Activity    Alcohol use: Yes     Comment: wine  beer occ    Drug use: No    Sexual activity: Yes     Partners: Male   Other Topics Concern    Caffeine Concern Yes     Comment: coffee, soda, 1 cup daily        Medications (Active prior to today's visit):  Current Outpatient Medications   Medication Sig Dispense Refill    polyethylene glycol, PEG 3350-KCl-NaBcb-NaCl-NaSulf, 236 g Oral Recon Soln Take 4,000 mL by mouth As Directed. Take 2,000 mL the night before your procedure and 2,000 mL the morning of your procedure. Take as directed by GI clinic. Okay to substitute for generic. 1 each 0    triamcinolone 0.1 % External Cream Apply topically 2 (two) times daily. Apply to affected area sparingly 2 times daily. (Patient not taking: Reported on 10/2/2024) 30 g 0    ketoconazole 2 % External Cream Apply 1 Application topically daily. 2 weeks (Patient not taking: Reported on 10/2/2024) 30 g 0    Multiple  Vitamins-Minerals (MULTI FOR HER) Oral Tab Take by mouth. (Patient not taking: Reported on 10/2/2024)         Allergies:  No Known Allergies    ROS:   CONSTITUTIONAL: negative for fevers, chills, sweats  EYES Negative for scleral icterus or redness, and diplopia  HEENT: Negative for hoarseness  RESPIRATORY: Negative for cough and severe shortness of breath  CARDIOVASCULAR: Negative for crushing sub-sternal chest pain  GASTROINTESTINAL: See HPI  GENITOURINARY: Negative for dysuria  MUSCULOSKELETAL: Negative for arthralgias and myalgias  SKIN: Negative for jaundice, rash or pruritus  NEUROLOGICAL: Negative for dizziness and headaches  BEHAVIOR/PSYCH: Negative for psychotic behavior      PHYSICAL EXAM:   Blood pressure 101/68, pulse 58, height 5' 5\" (1.651 m), weight 158 lb (71.7 kg), last menstrual period 12/01/2023, not currently breastfeeding.    GEN: Alert, no acute distress, well-nourished   HEENT: anicteric sclera, neck supple, trachea midline, MMM, no palpable or tender neck or supraclavicular lymph nodes  CV: RRR, the extremities are warm and well perfused   LUNGS: No increased work of breathing, CTAB  ABDOMEN: Soft, symmetrical, non-tender without distention or guarding. No scars or lesions. Aorta is without bruit or visible pulsation. Umbilicus is midline without herniation. Normoactive bowel sounds are present, No masses, hepatomegaly or splenomegaly noted.  MSK: No erythema, no warmth, no swelling of joints  SKIN: No jaundice, no erythema, no rashes, no lesions  HEMATOLOGIC: No bleeding, no bruising  NEURO: Alert and interactive, JETT  PSYCH: appropriate mood & affect    Labs/Imaging:     Patient's labs and imaging were reviewed and discussed with patient today.    .  ASSESSMENT/PLAN:   Madelin Aragon is a 44 year old year-old female without significant medical history, who presents for colon cancer screening evaluation.    #inadequate bowel prep   #CRC screen  -attempted colonoscopy in January, large  solid solid in sigmoid, unable to advance scope. Patient reports she did complete the golytely and stool was liquid yellow prior to procedure. She reports bowel habits are daily to every few days. Does not consider constipation an issue for her. We did discuss diet and lifestyle for bowel regularity   -multiple extended family members with colon & rectal cancer    Recommend:  -for constipation try diet /lifestyle changes first. If needed can take miralax as needed or daily to manage    1. Schedule colonoscopy with Dr. Caro  Diagnosis: CRC screen   Sedation: MAC or IV  Prep: split dose golytely  -Buy over the counter dulcolax laxative, and take one tablet daily for 3 days prior to drinking the bowel prep.   AND  -Buy over the counter miralax, mix one capful with water daily for 2 weeks prior to drinking the bowel prep.    *Do NOT to consume seeds, nuts, corn, popcorn or raw fruits/vegetables for 5 days prior to next colonoscopy. And start clear liquid diet the day prior (no solid food breakfast).    Endoscopy risk/benefit discussion: I have thoroughly discussed the risks, benefits, and alternatives of endoscopic evaluation with the patient, who demonstrated understanding. This includes the potential risks of bleeding, infection, pain, anesthesia complications, and perforation, which may result in prolonged hospitalization or surgical intervention. All of the patient’s questions were addressed to their satisfaction. The patient has chosen to proceed with the endoscopic procedure, including any necessary interventions such as polypectomy, biopsy, control of bleeding.        Orders This Visit:  No orders of the defined types were placed in this encounter.      Meds This Visit:  Requested Prescriptions     Signed Prescriptions Disp Refills    polyethylene glycol, PEG 3350-KCl-NaBcb-NaCl-NaSulf, 236 g Oral Recon Soln 1 each 0     Sig: Take 4,000 mL by mouth As Directed. Take 2,000 mL the night before your procedure and  2,000 mL the morning of your procedure. Take as directed by GI clinic. Okay to substitute for generic.       Imaging & Referrals:  None       TRAN Emery  [facility]  10/2/2024      The dictation was partially prepared using Dragon Medical voice recognition software. As a result, errors may occur. When identified, these errors have been corrected. While every attempt is made to correct errors during dictation, discrepancies may still exist.

## 2024-10-02 NOTE — PATIENT INSTRUCTIONS
-for constipation try diet /lifestyle changes first. If needed can take miralax as needed or daily to manage    -------------------------------------------------------------------------------------------------      1. Schedule colonoscopy with Dr. abnuelos  Diagnosis: CRC screen   Sedation: MAC or IV  Prep: split dose golytely  -Buy over the counter dulcolax laxative, and take one tablet daily for 3 days prior to drinking the bowel prep.   AND  -Buy over the counter miralax, mix one capful with water daily for 2 weeks prior to drinking the bowel prep.    *Do NOT to consume seeds, nuts, corn, popcorn or raw fruits/vegetables for 5 days prior to next colonoscopy. And start clear liquid diet the day prior (no solid food breakfast).      2.  bowel prep from pharmacy   You can pick the bowel prep up now and store in a cool, dry place in your home until your scheduled bowel prep start date.    3. Continue all medications as normal for your procedure. DO NOT TAKE: Any form of alcohol, recreational drugs and any forms of erectile dysfunction medications 24 hours prior to procedure.    4. Read all bowel prep instructions carefully. Bowel prep instructions can also be found online at:  www.health.org/giprep     5. AVOID seeds, nuts, popcorn, raw fruits and vegetables for 5 days before procedure    6. If you start any NEW medication after your visit today, please notify us. Certain medications (like iron or weight loss medications) will need to be held before the procedure, or the procedure cannot be performed safely.

## 2024-10-14 ENCOUNTER — ANESTHESIA (OUTPATIENT)
Dept: ENDOSCOPY | Age: 44
End: 2024-10-14
Payer: COMMERCIAL

## 2024-10-14 ENCOUNTER — HOSPITAL ENCOUNTER (OUTPATIENT)
Age: 44
Setting detail: HOSPITAL OUTPATIENT SURGERY
Discharge: HOME | End: 2024-10-14
Attending: STUDENT IN AN ORGANIZED HEALTH CARE EDUCATION/TRAINING PROGRAM | Admitting: STUDENT IN AN ORGANIZED HEALTH CARE EDUCATION/TRAINING PROGRAM
Payer: COMMERCIAL

## 2024-10-14 ENCOUNTER — ANESTHESIA EVENT (OUTPATIENT)
Dept: ENDOSCOPY | Age: 44
End: 2024-10-14
Payer: COMMERCIAL

## 2024-10-14 VITALS
SYSTOLIC BLOOD PRESSURE: 114 MMHG | HEART RATE: 48 BPM | OXYGEN SATURATION: 100 % | DIASTOLIC BLOOD PRESSURE: 62 MMHG | BODY MASS INDEX: 25.99 KG/M2 | RESPIRATION RATE: 19 BRPM | HEIGHT: 65 IN | WEIGHT: 156 LBS

## 2024-10-14 DIAGNOSIS — Z12.11 COLON CANCER SCREENING: ICD-10-CM

## 2024-10-14 LAB — B-HCG UR QL: NEGATIVE

## 2024-10-14 PROCEDURE — 45380 COLONOSCOPY AND BIOPSY: CPT | Performed by: STUDENT IN AN ORGANIZED HEALTH CARE EDUCATION/TRAINING PROGRAM

## 2024-10-14 PROCEDURE — 99070 SPECIAL SUPPLIES PHYS/QHP: CPT | Performed by: STUDENT IN AN ORGANIZED HEALTH CARE EDUCATION/TRAINING PROGRAM

## 2024-10-14 RX ORDER — NALOXONE HYDROCHLORIDE 0.4 MG/ML
0.08 INJECTION, SOLUTION INTRAMUSCULAR; INTRAVENOUS; SUBCUTANEOUS ONCE AS NEEDED
OUTPATIENT
Start: 2024-10-14 | End: 2024-10-14

## 2024-10-14 RX ORDER — LIDOCAINE HYDROCHLORIDE 10 MG/ML
INJECTION, SOLUTION EPIDURAL; INFILTRATION; INTRACAUDAL; PERINEURAL AS NEEDED
Status: DISCONTINUED | OUTPATIENT
Start: 2024-10-14 | End: 2024-10-14 | Stop reason: SURG

## 2024-10-14 RX ORDER — SODIUM CHLORIDE, SODIUM LACTATE, POTASSIUM CHLORIDE, CALCIUM CHLORIDE 600; 310; 30; 20 MG/100ML; MG/100ML; MG/100ML; MG/100ML
INJECTION, SOLUTION INTRAVENOUS CONTINUOUS
Status: DISCONTINUED | OUTPATIENT
Start: 2024-10-14 | End: 2024-10-14

## 2024-10-14 RX ORDER — SODIUM CHLORIDE, SODIUM LACTATE, POTASSIUM CHLORIDE, CALCIUM CHLORIDE 600; 310; 30; 20 MG/100ML; MG/100ML; MG/100ML; MG/100ML
INJECTION, SOLUTION INTRAVENOUS CONTINUOUS
OUTPATIENT
Start: 2024-10-14

## 2024-10-14 RX ADMIN — LIDOCAINE HYDROCHLORIDE 50 MG: 10 INJECTION, SOLUTION EPIDURAL; INFILTRATION; INTRACAUDAL; PERINEURAL at 14:33:00

## 2024-10-14 RX ADMIN — SODIUM CHLORIDE, SODIUM LACTATE, POTASSIUM CHLORIDE, CALCIUM CHLORIDE: 600; 310; 30; 20 INJECTION, SOLUTION INTRAVENOUS at 14:43:00

## 2024-10-14 RX ADMIN — SODIUM CHLORIDE, SODIUM LACTATE, POTASSIUM CHLORIDE, CALCIUM CHLORIDE: 600; 310; 30; 20 INJECTION, SOLUTION INTRAVENOUS at 14:55:00

## 2024-10-14 RX ADMIN — SODIUM CHLORIDE, SODIUM LACTATE, POTASSIUM CHLORIDE, CALCIUM CHLORIDE: 600; 310; 30; 20 INJECTION, SOLUTION INTRAVENOUS at 14:32:00

## 2024-10-14 NOTE — H&P
History & Physical Examination    Patient Name: Madelin Aragon  MRN: [id number]  CSN: [id number]  YOB: 1980    Diagnosis: screening for colon cancer    Prescriptions Prior to Admission[1]  Current Facility-Administered Medications   Medication Dose Route Frequency    lactated ringers infusion   Intravenous Continuous       Allergies: Allergies[2]    Past Medical History:    Scoliosis    TMJ (temporomandibular joint syndrome)     Past Surgical History:   Procedure Laterality Date    Colonoscopy N/A 2024    Procedure: COLONOSCOPY;  Surgeon: Luis Caro MD;  Location: [facility] ENDO      , 2010    Other surgical history      repair-labial adhesion PP     Family History   Problem Relation Age of Onset    Cancer Father 57        appendix    Colon Cancer Father     Infectious Disease Mother         hepatitis    Neurological Disorder Maternal Grandfather         cerebral aneurysm    Cancer Paternal Grandmother         rectal    Cancer Paternal Grandfather         lung-smoker    Breast Cancer Paternal Aunt 60    Cancer Paternal Aunt         Colon cancer     Social History     Tobacco Use    Smoking status: Former    Smokeless tobacco: Never    Tobacco comments:     quit    Substance Use Topics    Alcohol use: Yes     Comment: wine  beer occ       SYSTEM Check if Review is Normal Check if Physical Exam is Normal If not normal, please explain:   HEENT [X ] [ X]    NECK  [X ] [ X]    HEART [X ] [ X]    LUNGS [X ] [ X]    ABDOMEN [X ] [ X]    EXTREMITIES [X ] [ X]    OTHER        I have discussed the risks and benefits and alternatives of the procedure with the patient/family.  They understand and agree to proceed with plan of care.   I have reviewed the History and Physical done within the last 30 days.  Any changes noted above.    Luis Caro MD  [facility]                   [1]   Medications Prior to Admission   Medication Sig Dispense Refill Last  Dose/Taking    polyethylene glycol, PEG 3350-KCl-NaBcb-NaCl-NaSulf, 236 g Oral Recon Soln Take 4,000 mL by mouth As Directed. Take 2,000 mL the night before your procedure and 2,000 mL the morning of your procedure. Take as directed by GI clinic. Okay to substitute for generic. 1 each 0     triamcinolone 0.1 % External Cream Apply topically 2 (two) times daily. Apply to affected area sparingly 2 times daily. (Patient not taking: Reported on 10/2/2024) 30 g 0     ketoconazole 2 % External Cream Apply 1 Application topically daily. 2 weeks (Patient not taking: Reported on 10/2/2024) 30 g 0     Multiple Vitamins-Minerals (MULTI FOR HER) Oral Tab Take by mouth. (Patient not taking: Reported on 10/2/2024)      [2] No Known Allergies

## 2024-10-14 NOTE — DISCHARGE INSTRUCTIONS
Home Care Instructions for Colonoscopy with Sedation    Diet:  - Resume your regular diet as tolerated unless otherwise instructed.  - Start with light meals to minimize bloating.  - Do not drink alcohol today.    Medication:  - If you have questions about resuming your normal medications, please contact your Primary Care Physician.    Activities:  - Take it easy today. Do not return to work today.  - Do not drive today.  - Do not operate any machinery today (including kitchen equipment).    Colonoscopy:  - You may notice some rectal \"spotting\" (a little blood on the toilet tissue) for a day or two after the exam. This is normal.  - If you experience any rectal bleeding (not spotting), persistent tenderness or sharp severe abdominal pains, oral temperature over 100 degrees Fahrenheit, light-headedness or dizziness, or any other problems, contact your doctor.    **If unable to reach your doctor, please go to the [facility] Emergency Room**    - Your referring physician will receive a full report of your examination.  - If you do not hear from your doctor's office within two weeks of your biopsy, please call them for your results.    You may be able to see your laboratory results in iGo between 4 and 7 business days.  In some cases, your physician may not have viewed the results before they are released to iGo.  If you have questions regarding your results contact the physician who ordered the test/exam by phone or via iGo by choosing \"Ask a Medical Question.\"

## 2024-10-14 NOTE — OPERATIVE REPORT
COLONOSCOPY REPORT    Madelin Aragon     1980 Age 44 year old   PCP No primary care provider on file. Endoscopist Luis Caro MD       Date of procedure: 10/14/24    Procedure: Colonoscopy w/ cold forceps polypectomy    Pre-operative diagnosis: screening, family hx of colon cancer    Post-operative diagnosis: polyp    Medications: MAC    Withdrawal time: 9 minutes    Procedure:  Informed consent was obtained from the patient after the risks of the procedure were discussed, including but not limited to bleeding, perforation, aspiration, infection, or possibility of a missed lesion. After discussions of the risks/benefits and alternatives to this procedure, as well as the planned sedation, the patient was placed in the left lateral decubitus position and begun on continuous blood pressure pulse oximetry and EKG monitoring and this was maintained throughout the procedure. Once an adequate level of sedation was obtained a digital rectal exam was completed. Then the lubricated tip of the Pediatric [id number] diagnostic video colonoscope was inserted and advanced without difficulty to the cecum using water immersion and CO2 insufflation technique. The cecum was identified by localizing the trifold, the appendix and the ileocecal valve. Withdrawal was begun with thorough washing and careful examination of the colonic walls and folds. A routine second examination of the cecum/ascending colon was performed. Photodocumentation was obtained. The bowel prep was good. Views of the colon were good with washing. I then carefully withdrew the instrument from the patient who tolerated the procedure well.     Complications: none.    Findings:   1. 1 polyp noted as follows:      A. 2 mm polyp in the rectum; sessile morphology; cold forceps polypectomy performed, polyp retrieved.    2. Diverticulosis: no large diverticula visualized.    3. Ileocecal valve appeared healthy and normal.     4. The colonic  mucosa throughout the colon showed normal vascular pattern, without evidence of angioectasias or inflammation.     5. Rectum was meticulously evaluated in antegrade view and was unremarkable    6. JOSE CARLOS: normal rectal tone, no masses palpated.     Impression:   2 mm polyp removed.  The colon was otherwise normal with glistening mucosa and intact vascular pattern.    Recommend:  Await pathology. The interval for the next colonoscopy will be determined after reviewing pathology. If new signs or symptoms develop, colonoscopy may need to be repeated sooner.   High fiber diet.  Monitor for blood in the stool. If having more than just tinge of blood, call office or go to the ER.  Repeat in 5 years.    >>>If tissue was obtained and you have not received your pathology results either by phone or letter within 2 weeks, please call our office at [phone].    Specimens: polyp    Blood loss: <1 ml

## 2024-10-14 NOTE — ANESTHESIA POSTPROCEDURE EVALUATION
Patient: Madelin Aragon    Procedure Summary       Date: 10/14/24 Room / Location: [facility] ENDOSCOPY 01 / [facility] ENDO    Anesthesia Start: 1432 Anesthesia Stop: 1501    Procedure: COLONOSCOPY Diagnosis:       Colon cancer screening      (colon polyp,)    Surgeons: Luis Caro MD Anesthesiologist: Omega Cummings DO    Anesthesia Type: MAC ASA Status: 1            Anesthesia Type: MAC    Vitals Value Taken Time   BP 90/53 10/14/24 1501   Temp  10/14/24 1503   Pulse 54 10/14/24 1501   Resp 18 10/14/24 1501   SpO2 100 % 10/14/24 1501       [facility] AN Post Evaluation:   Patient Evaluated in PACU  Patient Participation: complete - patient participated  Level of Consciousness: awake  Pain Management: adequate  Airway Patency:patent  Dental exam unchanged from preop  Yes    Nausea/Vomiting: none  Cardiovascular Status: acceptable  Respiratory Status: acceptable and room air  Postoperative Hydration acceptable      OMEGA CUMMINGS DO  10/14/2024 3:03 PM

## 2024-10-14 NOTE — ANESTHESIA PREPROCEDURE EVALUATION
Anesthesia PreOp Note    HPI:     Madelin Aragon is a 44 year old female who presents for preoperative consultation requested by: Luis Caro MD    Date of Surgery: 10/14/2024    Procedure(s):  COLONOSCOPY  Indication: Colon cancer screening    Relevant Problems   No relevant active problems       NPO:  Last Liquid Consumption Date: 10/14/24  Last Liquid Consumption Time: 1100  Last Solid Consumption Date: 10/13/24  Last Solid Consumption Time: 0900  Last Liquid Consumption Date: 10/14/24          History Review:  Patient Active Problem List    Diagnosis Date Noted    Colon cancer screening 2024       Past Medical History:    Scoliosis    TMJ (temporomandibular joint syndrome)       Past Surgical History:   Procedure Laterality Date    Colonoscopy N/A 2024    Procedure: COLONOSCOPY;  Surgeon: Luis Caor MD;  Location: [facility]  , 2010    Other surgical history      repair-labial adhesion PP       Prescriptions Prior to Admission[1]  Current Medications and Prescriptions Ordered in Epic[2]    Allergies[3]    Family History   Problem Relation Age of Onset    Cancer Father 57        appendix    Colon Cancer Father     Infectious Disease Mother         hepatitis    Neurological Disorder Maternal Grandfather         cerebral aneurysm    Cancer Paternal Grandmother         rectal    Cancer Paternal Grandfather         lung-smoker    Breast Cancer Paternal Aunt 60    Cancer Paternal Aunt         Colon cancer     Social History     Socioeconomic History    Marital status:    Tobacco Use    Smoking status: Former    Smokeless tobacco: Never    Tobacco comments:     quit    Vaping Use    Vaping status: Never Used   Substance and Sexual Activity    Alcohol use: Yes     Comment: wine  beer occ    Drug use: No    Sexual activity: Yes     Partners: Male   Other Topics Concern    Caffeine Concern Yes     Comment: coffee, soda, 1 cup daily       Available pre-op  labs reviewed.  Lab Results   Component Value Date    URINEPREG Negative 10/14/2024             Vital Signs:  Body mass index is 25.96 kg/m².   height is 1.651 m (5' 5\") and weight is 70.8 kg (156 lb). Her blood pressure is 101/54 and her pulse is 46 (abnormal). Her respiration is 20 and oxygen saturation is 100%.   Vitals:    10/10/24 1401 10/14/24 1356   BP:  101/54   Pulse:  (!) 46   Resp:  20   SpO2:  100%   Weight: 71.7 kg (158 lb) 70.8 kg (156 lb)   Height: 1.651 m (5' 5\") 1.651 m (5' 5\")        Anesthesia Evaluation      Airway   Mallampati: I  TM distance: >3 FB  Neck ROM: full  Dental          Pulmonary - negative ROS and normal exam   Cardiovascular - negative ROS and normal exam    Neuro/Psych - negative ROS     GI/Hepatic/Renal - negative ROS     Endo/Other - negative ROS   Abdominal                  Anesthesia Plan:   ASA:  1  Plan:   MAC  Post-op Pain Management: IV analgesics  Plan Comments: I have discussed the anesthetic plan, major risks and alternatives with the patient and answered all questions. The patient desires to proceed with surgery and anesthesia as planned.     Informed Consent Plan and Risks Discussed With:  Patient      I have informed Madelin Aragon and/or legal guardian or family member of the nature of the anesthetic plan, benefits, risks including possible dental damage if relevant, major complications, and any alternative forms of anesthetic management.   All of the patient's questions were answered to the best of my ability. The patient desires the anesthetic management as planned.  UTE GR DO  10/14/2024 2:29 PM  Present on Admission:  **None**           [1]   Medications Prior to Admission   Medication Sig Dispense Refill Last Dose/Taking    polyethylene glycol, PEG 3350-KCl-NaBcb-NaCl-NaSulf, 236 g Oral Recon Soln Take 4,000 mL by mouth As Directed. Take 2,000 mL the night before your procedure and 2,000 mL the morning of your procedure. Take as directed by GI  clinic. Okay to substitute for generic. 1 each 0     triamcinolone 0.1 % External Cream Apply topically 2 (two) times daily. Apply to affected area sparingly 2 times daily. (Patient not taking: Reported on 10/2/2024) 30 g 0     ketoconazole 2 % External Cream Apply 1 Application topically daily. 2 weeks (Patient not taking: Reported on 10/2/2024) 30 g 0     Multiple Vitamins-Minerals (MULTI FOR HER) Oral Tab Take by mouth. (Patient not taking: Reported on 10/2/2024)      [2]   Current Facility-Administered Medications Ordered in Epic   Medication Dose Route Frequency Provider Last Rate Last Admin    lactated ringers infusion   Intravenous Continuous Luis Caro MD         No current [facility]-ordered outpatient medications on file.   [3] No Known Allergies

## 2024-10-16 ENCOUNTER — TELEPHONE (OUTPATIENT)
Facility: CLINIC | Age: 44
End: 2024-10-16

## 2024-10-16 NOTE — PROGRESS NOTES
Repeat in 5 years given family history of colon cancer.    Mychart sent.    GI Staff:    Can you please place recall for this patient to have a colonoscopy in 5 years.    Thank you.    Luis Caro MD

## 2024-10-16 NOTE — TELEPHONE ENCOUNTER
----- Message from Luis Caro sent at 10/16/2024  3:29 PM CDT -----  Repeat in 5 years given family history of colon cancer.    Mychart sent.    GI Staff:    Can you please place recall for this patient to have a colonoscopy in 5 years.    Thank you.    Luis Caro MD

## 2024-10-16 NOTE — TELEPHONE ENCOUNTER
Health maintenance Updated.    5 year colonoscopy recall entered into patient outreach in [facility]  Next colonoscopy will be due 10/14/2029.

## 2025-05-17 ENCOUNTER — HOSPITAL ENCOUNTER (OUTPATIENT)
Age: 45
Discharge: HOME | End: 2025-05-17
Payer: COMMERCIAL

## 2025-05-17 VITALS
HEART RATE: 62 BPM | TEMPERATURE: 98 F | DIASTOLIC BLOOD PRESSURE: 63 MMHG | SYSTOLIC BLOOD PRESSURE: 116 MMHG | OXYGEN SATURATION: 100 % | RESPIRATION RATE: 16 BRPM

## 2025-05-17 DIAGNOSIS — L50.0 ALLERGIC URTICARIA: Primary | ICD-10-CM

## 2025-05-17 RX ORDER — PREDNISONE 20 MG/1
40 TABLET ORAL DAILY
Qty: 10 TABLET | Refills: 0 | Status: SHIPPED | OUTPATIENT
Start: 2025-05-17 | End: 2025-05-22

## 2025-05-17 RX ORDER — HYDROXYZINE HYDROCHLORIDE 25 MG/1
TABLET, FILM COATED ORAL EVERY 4 HOURS PRN
Qty: 20 TABLET | Refills: 0 | Status: SHIPPED | OUTPATIENT
Start: 2025-05-17 | End: 2025-06-16

## 2025-05-17 NOTE — ED PROVIDER NOTES
Patient Seen in: [facility]      History     Chief Complaint   Patient presents with    Rash Skin Problem     Stated Complaint: rash    Subjective:   44-year-old female presents to immediate care for rash.  Patient states that yesterday she started noticed small red bumps after going for a walk that appeared to be a heat rash.  She states it got worse throughout the day but did dissipate with Benadryl.  She reports that today she woke up and she has increased red raised areas.  Denies any history of allergies, denies any medications, denies any changes to diet, medications, lotions or soaps            Objective:     Past Medical History:    Scoliosis    TMJ (temporomandibular joint syndrome)              Past Surgical History:   Procedure Laterality Date    Colonoscopy N/A 2024    Procedure: COLONOSCOPY;  Surgeon: Luis Caro MD;  Location: [facility] ENDO    Colonoscopy N/A 10/14/2024    Procedure: COLONOSCOPY;  Surgeon: Luis Caro MD;  Location: [facility] ENDO      , 2010    Other surgical history      repair-labial adhesion PP                Social History     Socioeconomic History    Marital status:    Tobacco Use    Smoking status: Former    Smokeless tobacco: Never    Tobacco comments:     quit    Vaping Use    Vaping status: Never Used   Substance and Sexual Activity    Alcohol use: Yes     Comment: wine  beer occ    Drug use: No    Sexual activity: Yes     Partners: Male   Other Topics Concern    Caffeine Concern Yes     Comment: coffee, soda, 1 cup daily              Review of Systems    Positive for stated complaint: rash  Other systems are as noted in HPI.  Constitutional and vital signs reviewed.      All other systems reviewed and negative except as noted above.    Physical Exam     ED Triage Vitals   BP 25 0833 116/63   Pulse 25 0833 62   Resp 25 0833 16   Temp 25 0833 97.8 °F (36.6 °C)   Temp src 25 0833 Oral   SpO2  05/17/25 0833 100 %   O2 Device 05/17/25 0832 None (Room air)       Current Vitals:   Vital Signs  BP: 116/63  Pulse: 62  Resp: 16  Temp: 97.8 °F (36.6 °C)  Temp src: Oral    Oxygen Therapy  SpO2: 100 %  O2 Device: None (Room air)          Physical Exam  Vitals and nursing note reviewed.   Constitutional:       General: She is not in acute distress.  HENT:      Head: Normocephalic.   Cardiovascular:      Rate and Rhythm: Normal rate.   Pulmonary:      Effort: Pulmonary effort is normal.   Musculoskeletal:         General: Normal range of motion.   Skin:     General: Skin is warm and dry.      Findings: Rash present. Rash is urticarial.   Neurological:      General: No focal deficit present.      Mental Status: She is alert and oriented to person, place, and time.       ED Course   Labs Reviewed - No data to display       MDM        Medical Decision Making  Pertinent Labs & Imaging studies reviewed. (See chart for details).  Patient coming in with red raised rash.   Differential diagnosis includes allergic urticaria     Patient is comfortable with plan of care.     Overall Pt looks good. Non-toxic, well-hydrated and in no respiratory distress. Vital signs are reassuring. Exam is reassuring. I do not believe pt requires and additional diagnostic studies or intervention. I believe pt can be discharged home to continue evaluation as an outpatient. Follow-up provider given. Discharge instructions given and reviewed. Return for any problems. All understand and agrees with the plan.        Problems Addressed:  Allergic urticaria: acute illness or injury    Risk  OTC drugs.  Prescription drug management.          Disposition and Plan     Clinical Impression:  1. Allergic urticaria         Disposition:  Discharge  5/17/2025  8:51 am    Follow-up:  No follow-up provider specified.        Medications Prescribed:  Discharge Medication List as of 5/17/2025  8:53 AM        START taking these medications    Details   predniSONE 20  MG Oral Tab Take 2 tablets (40 mg total) by mouth daily for 5 days., Normal, Disp-10 tablet, R-0      hydrOXYzine 25 MG Oral Tab Take 1-2 tablets (25-50 mg total) by mouth every 4 (four) hours as needed for Itching., Normal, Disp-20 tablet, R-0                   Supplementary Documentation:

## 2025-05-17 NOTE — ED INITIAL ASSESSMENT (HPI)
Pt c/o rash all over body with itching since yesterday.  No new foods, meds, products.  No SOB.  No throat tightness.

## 2025-05-17 NOTE — DISCHARGE INSTRUCTIONS
Take steroids over the next 5 days  Return for any shortness of breath, throat tightness, increased rash or any other concerns  Atarax/hydroxyzine as needed for itching

## (undated) DEVICE — 60 ML SYRINGE REGULAR TIP: Brand: MONOJECT

## (undated) DEVICE — MEDI-VAC NON-CONDUCTIVE SUCTION TUBING 6MM X 1.8M (6FT.) L: Brand: CARDINAL HEALTH

## (undated) DEVICE — Device: Brand: DUAL NARE NASAL CANNULAE FEMALE LUER CON 7FT O2 TUBE

## (undated) DEVICE — CO2 CANNULA,SSOFT,ADLT,7O2,4CO2,FEMALE: Brand: MEDLINE

## (undated) DEVICE — V2 SPECIMEN COLLECTION MANIFOLD KIT: Brand: NEPTUNE

## (undated) DEVICE — KIT ENDO ORCAPOD 160/180/190

## (undated) DEVICE — SINGLE-USE SNARE: Brand: CAPTIVATOR™ COLD

## (undated) DEVICE — KIT CLEAN ENDOKIT 1.1OZ GOWNX2

## (undated) DEVICE — SYRINGE, LUER SLIP, STERILE, 60ML: Brand: MEDLINE

## (undated) DEVICE — GIJAW SINGLE-USE BIOPSY FORCEPS WITH NEEDLE: Brand: GIJAW

## (undated) NOTE — LETTER
11/25/2020              [place] [id number]         Dear Gil Carolina,    This letter is to inform you that our office has made several attempts to reach you by phone without success.   We were attempting to

## (undated) NOTE — LETTER
[facility]  [address], [place], [place]  Authorization for Surgical Operation and Procedure                                                                                           I hereby authorize Luis Caro MD, my physician and his/her assistants (if applicable), which may include medical students, residents, and/or fellows, to perform the following surgical operation/ procedure and administer such anesthesia as may be determined necessary by my physician: Operation/Procedure name (s) COLONOSCOPY on Madelin A Landeck   2.   I recognize that during the surgical operation/procedure, unforeseen conditions may necessitate additional or different procedures than those listed above.  I, therefore, further authorize and request that the above-named surgeon, assistants, or designees perform such procedures as are, in their judgment, necessary and desirable.    3.   My surgeon/physician has discussed prior to my surgery the potential benefits, risks and side effects of this procedure; the likelihood of achieving goals; and potential problems that might occur during recuperation.  They also discussed reasonable alternatives to the procedure, including risks, benefits, and side effects related to the alternatives and risks related to not receiving this procedure.  I have had all my questions answered and I acknowledge that no guarantee has been made as to the result that may be obtained.    4.   Should the need arise during my operation/procedure, which includes change of level of care prior to discharge, I also consent to the administration of blood and/or blood products.  Further, I understand that despite careful testing and screening of blood or blood products by collecting agencies, I may still be subject to ill effects as a result of receiving a blood transfusion and/or blood products.  The following are some, but not all, of the potential risks that can occur: fever and allergic  reactions, hemolytic reactions, transmission of diseases such as Hepatitis, AIDS and Cytomegalovirus (CMV) and fluid overload.  In the event that I wish to have an autologous transfusion of my own blood, or a directed donor transfusion, I will discuss this with my physician.  Check only if Refusing Blood or Blood Products  I understand refusal of blood or blood products as deemed necessary by my physician may have serious consequences to my condition to include possible death. I hereby assume responsibility for my refusal and release the hospital, its personnel, and my physicians from any responsibility for the consequences of my refusal.    o  Refuse   5.   I authorize the use of any specimen, organs, tissues, body parts or foreign objects that may be removed from my body during the operation/procedure for diagnosis, research or teaching purposes and their subsequent disposal by hospital authorities.  I also authorize the release of specimen test results and/or written reports to my treating physician on the hospital medical staff or other referring or consulting physicians involved in my care, at the discretion of the Pathologist or my treating physician.    6.   I consent to the photographing or videotaping of the operations or procedures to be performed, including appropriate portions of my body for medical, scientific, or educational purposes, provided my identity is not revealed by the pictures or by descriptive texts accompanying them.  If the procedure has been photographed/videotaped, the surgeon will obtain the original picture, image, videotape or CD.  The hospital will not be responsible for storage, release or maintenance of the picture, image, tape or CD.    7.   I consent to the presence of a  or observers in the operating room as deemed necessary by my physician or their designees.    8.   I recognize that in the event my procedure results in extended X-Ray/fluoroscopy time, I may  develop a skin reaction.    9. If I have a Do Not Attempt Resuscitation (DNAR) order in place, that status will be suspended while in the operating room, procedural suite, and during the recovery period unless otherwise explicitly stated by me (or a person authorized to consent on my behalf). The surgeon or my attending physician will determine when the applicable recovery period ends for purposes of reinstating the DNAR order.  10. Patients having a sterilization procedure: I understand that if the procedure is successful the results will be permanent and it will therefore be impossible for me to inseminate, conceive, or bear children.  I also understand that the procedure is intended to result in sterility, although the result has not been guaranteed.   11. I acknowledge that my physician has explained sedation/analgesia administration to me including the risk and benefits I consent to the administration of sedation/analgesia as may be necessary or desirable in the judgment of my physician.    I CERTIFY THAT I HAVE READ AND FULLY UNDERSTAND THE ABOVE CONSENT TO OPERATION and/or OTHER PROCEDURE.     _________________________________________ _________________________________     ___________________________________  Signature of Patient     Signature of Responsible Person                   Printed Name of Responsible Person                              _________________________________________ ______________________________        ___________________________________  Signature of Witness         Date  Time         Relationship to Patient    STATEMENT OF PHYSICIAN My signature below affirms that prior to the time of the procedure; I have explained to the patient and/or his/her legal representative, the risks and benefits involved in the proposed treatment and any reasonable alternative to the proposed treatment. I have also explained the risks and benefits involved in refusal of the proposed treatment and alternatives  to the proposed treatment and have answered the patient's questions. If I have a significant financial interest in a co-management agreement or a significant financial interest in any product or implant, or other significant relationship used in this procedure/surgery, I have disclosed this and had a discussion with my patient.     _______________________________________________________________ _____________________________  (Signature of Physician)                                                                                         (Date)                                   (Time)  Patient Name: Madelin MOLINA Margo    : 1980   Printed: 2024      Medical Record #: [id number]                                              Page 1 of 1

## (undated) NOTE — LETTER
[facility]  [address], [place], [place]    Authorization for Surgical Operation and Procedure                               I hereby authorize Luis Caro MD, my physician and his/her assistants (if applicable), which may include medical students, residents, and/or fellows, to perform the following surgical operation/ procedure and administer such anesthesia as may be determined necessary by my physician: Operation/Procedure name (s) COLONOSCOPY on Madelin A Landeck   2.   I recognize that during the surgical operation/procedure, unforeseen conditions may necessitate additional or different procedures than those listed above.  I, therefore, further authorize and request that the above-named surgeon, assistants, or designees perform such procedures as are, in their judgment, necessary and desirable.    3.   My surgeon/physician has discussed prior to my surgery the potential benefits, risks and side effects of this procedure; the likelihood of achieving goals; and potential problems that might occur during recuperation.  They also discussed reasonable alternatives to the procedure, including risks, benefits, and side effects related to the alternatives and risks related to not receiving this procedure.  I have had all my questions answered and I acknowledge that no guarantee has been made as to the result that may be obtained.    4.   Should the need arise during my operation/procedure, which includes change of level of care prior to discharge, I also consent to the administration of blood and/or blood products.  Further, I understand that despite careful testing and screening of blood or blood products by collecting agencies, I may still be subject to ill effects as a result of receiving a blood transfusion and/or blood products.  The following are some, but not all, of the potential risks that can occur: fever and allergic reactions, hemolytic reactions, transmission of diseases  such as Hepatitis, AIDS and Cytomegalovirus (CMV) and fluid overload.  In the event that I wish to have an autologous transfusion of my own blood, or a directed donor transfusion, I will discuss this with my physician.  Check only if Refusing Blood or Blood Products  I understand refusal of blood or blood products as deemed necessary by my physician may have serious consequences to my condition to include possible death. I hereby assume responsibility for my refusal and release the hospital, its personnel, and my physicians from any responsibility for the consequences of my refusal.    o  Refuse   5.   I authorize the use of any specimen, organs, tissues, body parts or foreign objects that may be removed from my body during the operation/procedure for diagnosis, research or teaching purposes and their subsequent disposal by hospital authorities.  I also authorize the release of specimen test results and/or written reports to my treating physician on the hospital medical staff or other referring or consulting physicians involved in my care, at the discretion of the Pathologist or my treating physician.    6.   I consent to the photographing or videotaping of the operations or procedures to be performed, including appropriate portions of my body for medical, scientific, or educational purposes, provided my identity is not revealed by the pictures or by descriptive texts accompanying them.  If the procedure has been photographed/videotaped, the surgeon will obtain the original picture, image, videotape or CD.  The hospital will not be responsible for storage, release or maintenance of the picture, image, tape or CD.    7.   I consent to the presence of a  or observers in the operating room as deemed necessary by my physician or their designees.    8.   I recognize that in the event my procedure results in extended X-Ray/fluoroscopy time, I may develop a skin reaction.    9. If I have a Do Not Attempt  Resuscitation (DNAR) order in place, that status will be suspended while in the operating room, procedural suite, and during the recovery period unless otherwise explicitly stated by me (or a person authorized to consent on my behalf). The surgeon or my attending physician will determine when the applicable recovery period ends for purposes of reinstating the DNAR order.  10. Patients having a sterilization procedure: I understand that if the procedure is successful the results will be permanent and it will therefore be impossible for me to inseminate, conceive, or bear children.  I also understand that the procedure is intended to result in sterility, although the result has not been guaranteed.   11. I acknowledge that my physician has explained sedation/analgesia administration to me including the risk and benefits I consent to the administration of sedation/analgesia as may be necessary or desirable in the judgment of my physician.    I CERTIFY THAT I HAVE READ AND FULLY UNDERSTAND THE ABOVE CONSENT TO OPERATION and/or OTHER PROCEDURE.     ____________________________________  _________________________________        ______________________________  Signature of Patient    Signature of Responsible Person                Printed Name of Responsible Person                                      ____________________________________  _____________________________                ________________________________  Signature of Witness        Date  Time         Relationship to Patient    STATEMENT OF PHYSICIAN My signature below affirms that prior to the time of the procedure; I have explained to the patient and/or his/her legal representative, the risks and benefits involved in the proposed treatment and any reasonable alternative to the proposed treatment. I have also explained the risks and benefits involved in refusal of the proposed treatment and alternatives to the proposed treatment and have answered the patient's  questions. If I have a significant financial interest in a co-management agreement or a significant financial interest in any product or implant, or other significant relationship used in this procedure/surgery, I have disclosed this and had a discussion with my patient.     _____________________________________________________              _____________________________  (Signature of Physician)                                                                                         (Date)                                   (Time)  Patient Name: Madelin Aragon      : 1980      Printed: 10/10/2024     Medical Record #: [id number]                                      Page 1 of 1

## (undated) NOTE — LETTER
[facility]  Administration of Anesthesia  IMadelin agree to be cared for by a physician anesthesiologist alone and/or with a nurse anesthetist, who is specially trained to monitor me and give me medicine to put me to sleep or keep me comfortable during my procedure    I understand that my anesthesiologist and/or anesthetist is not an employee or agent of [facility] or [employer]. He or she works for [facility]    As the patient asking for anesthesia services, I agree to:  Allow the anesthesiologist (anesthesia doctor) to give me medicine and do additional procedures as necessary. Some examples are: Starting or using an “IV” to give me medicine, fluids or blood during my procedure, and having a breathing tube placed to help me breathe when I’m asleep (intubation). In the event that my heart stops working properly, I understand that my anesthesiologist will make every effort to sustain my life, unless otherwise directed by [facility] Do Not Resuscitate documents.  Tell my anesthesia doctor before my procedure:  If I am pregnant.  The last time that I ate or drank.  iii. All of the medicines I take (including prescriptions, herbal supplements, and pills I can buy without a prescription (including street drugs/illegal medications). Failure to inform my anesthesiologist about these medicines may increase my risk of anesthetic complications.  iv.If I am allergic to anything or have had a reaction to anesthesia before.  I understand how the anesthesia medicine will help me (benefits).  I understand that with any type of anesthesia medicine there are risks:  The most common risks are: nausea, vomiting, sore throat, muscle soreness, damage to my eyes, mouth, or teeth (from breathing tube placement).  Rare risks include: remembering what happened during my procedure, allergic reactions to medications, injury to my airway, heart, lungs, vision, nerves, or  muscles and in extremely rare instances death.  My doctor has explained to me other choices available to me for my care (alternatives).  Pregnant Patients (“epidural”):  I understand that the risks of having an epidural (medicine given into my back to help control pain during labor), include itching, low blood pressure, difficulty urinating, headache or slowing of the baby’s heart. Very rare risks include infection, bleeding, seizure, irregular heart rhythms and nerve injury.  Regional Anesthesia (“spinal”, “epidural”, & “nerve blocks”):  I understand that rare but potential complications include headache, bleeding, infection, seizure, irregular heart rhythms, and nerve injury.    _____________________________________________________________________________  Patient (or Representative) Signature/Relationship to Patient  Date   Time    _____________________________________________________________________________   Name (if used)    Language/Organization   Time    _____________________________________________________________________________  Nurse Anesthetist Signature     Date   Time  _____________________________________________________________________________  Anesthesiologist Signature     Date   Time  I have discussed the procedure and information above with the patient (or patient’s representative) and answered their questions. The patient or their representative has agreed to have anesthesia services.    _____________________________________________________________________________  Witness        Date   Time  I have verified that the signature is that of the patient or patient’s representative, and that it was signed before the procedure  Patient Name: Madelin Aragon     : 1980                 Printed: 10/10/2024 at 7:08 AM    Medical Record #: [id number]                                            Page 1 of 1  ----------ANESTHESIA CONSENT----------

## (undated) NOTE — MR AVS SNAPSHOT
After Visit Summary   11/5/2019    Kaye Pedraza    MRN: [id number]           Visit Information     Date & Time  11/5/2019  8:20 AM Provider  Luca Wayne, [address] [place] [address], [address] [place] [address],[address], [facility]/[employer]  Ph It is the patient's responsibility to check with and follow their insurance company's guidelines for prior authorization for this test.  You may be held responsible for payment in full if proper authorization is not acquired.   Please contact the Patient Bu 2 ½ hours per week – spread out over time Use a david to keep you motivated   Don’t forget strength training with weights and resistance Set goals and track your progress   You don’t need to join a gym. Home exercises work great.  Put more priority on exe Monday - Friday  4:00 pm - 10:00 pm  Saturday - Sunday  10:00 am - 4:00 pm       Conditions needing urgent attention, but are not life-threatening.          Average cost  $120*       EMERGENCY ROOM         Life-threatening emergencies needing immediate inte